# Patient Record
Sex: FEMALE | Race: WHITE | Employment: OTHER | URBAN - METROPOLITAN AREA
[De-identification: names, ages, dates, MRNs, and addresses within clinical notes are randomized per-mention and may not be internally consistent; named-entity substitution may affect disease eponyms.]

---

## 2017-03-25 DIAGNOSIS — F43.22 ADJUSTMENT REACTION WITH ANXIETY: ICD-10-CM

## 2019-01-29 ENCOUNTER — TELEPHONE (OUTPATIENT)
Dept: INTERNAL MEDICINE CLINIC | Age: 64
End: 2019-01-29

## 2021-03-08 ENCOUNTER — OFFICE VISIT (OUTPATIENT)
Dept: INTERNAL MEDICINE CLINIC | Age: 66
End: 2021-03-08
Payer: COMMERCIAL

## 2021-03-08 VITALS
HEIGHT: 64 IN | BODY MASS INDEX: 27.49 KG/M2 | SYSTOLIC BLOOD PRESSURE: 130 MMHG | DIASTOLIC BLOOD PRESSURE: 90 MMHG | WEIGHT: 161 LBS | HEART RATE: 80 BPM | TEMPERATURE: 97.5 F

## 2021-03-08 DIAGNOSIS — R03.0 ELEVATED BP WITHOUT DIAGNOSIS OF HYPERTENSION: ICD-10-CM

## 2021-03-08 DIAGNOSIS — Z00.00 ANNUAL PHYSICAL EXAM: Primary | ICD-10-CM

## 2021-03-08 DIAGNOSIS — Z12.31 ENCOUNTER FOR SCREENING MAMMOGRAM FOR BREAST CANCER: ICD-10-CM

## 2021-03-08 PROCEDURE — 99396 PREV VISIT EST AGE 40-64: CPT | Performed by: FAMILY MEDICINE

## 2021-03-08 SDOH — HEALTH STABILITY: MENTAL HEALTH: HOW OFTEN DO YOU HAVE A DRINK CONTAINING ALCOHOL?: 2-4 TIMES A MONTH

## 2021-03-08 SDOH — ECONOMIC STABILITY: TRANSPORTATION INSECURITY
IN THE PAST 12 MONTHS, HAS LACK OF TRANSPORTATION KEPT YOU FROM MEETINGS, WORK, OR FROM GETTING THINGS NEEDED FOR DAILY LIVING?: NO

## 2021-03-08 SDOH — HEALTH STABILITY: PHYSICAL HEALTH: ON AVERAGE, HOW MANY MINUTES DO YOU ENGAGE IN EXERCISE AT THIS LEVEL?: 40 MIN

## 2021-03-08 SDOH — HEALTH STABILITY: MENTAL HEALTH
STRESS IS WHEN SOMEONE FEELS TENSE, NERVOUS, ANXIOUS, OR CAN'T SLEEP AT NIGHT BECAUSE THEIR MIND IS TROUBLED. HOW STRESSED ARE YOU?: NOT ASKED

## 2021-03-08 SDOH — ECONOMIC STABILITY: TRANSPORTATION INSECURITY
IN THE PAST 12 MONTHS, HAS THE LACK OF TRANSPORTATION KEPT YOU FROM MEDICAL APPOINTMENTS OR FROM GETTING MEDICATIONS?: NO

## 2021-03-08 ASSESSMENT — PATIENT HEALTH QUESTIONNAIRE - PHQ9: SUM OF ALL RESPONSES TO PHQ QUESTIONS 1-9: 0

## 2021-03-08 NOTE — PATIENT INSTRUCTIONS
Body for Live by Micheal Lionel is an old book but still very effective program to build muscle and improve metabolism and drop body fat.,  (Caution: I don't recommend doing all the sets of weights, even with light dumbbell weights right away. Gradually increase the number of repetitions for each muscle group. .   Your muscles will be sore when you first start. You do not need to take extra protein supplements unless you are not eating well or you are lifting really heavy weights. The cardio program is good too but just doing any cardio 3 days per week is great. You do not need to eat 6 times per day unless you want to see rapid weight loss)    Body for Life by Micheal Flowers is an excellent reference book for exercise, especially the weight lifting/weight training portion, which can increase your metabolism and burn more calories. Make sure not to lift weights with the same muscle group for at least 72 hours, so the muscles can repair and grow. We need to grow our muscles to burn more calories. Use the weight lifting grids and spacing. Week 1:  Upper body, skip a day, lower body, skip a day, upper body. Week 2:  Lower body, skip a day, upper body, skip a day, lower body    Example:  pick several exercises for the body area you are work on for the day. Do 12 repetitions with the lightest weight, 10 repetitions with the second lightest weight, 8 repetitions with the next heavier weight and then 6 repetitions with the heaviest weight you can handle with good form. Then repeat the exercise 12 reps with the 3rd heaviest weight (the same one you used for the 8 reps). PREVENTION FOR HEALTH    Preventative services for both men and women. *  Weight and blood pressure checked regularly. /89 or lower is goal  * Try to maintain BMI of 18.5 to 25.   If you are exceptionally athletic, a waist measurement (measured parallel to the floor at the top of the hip bones) of 35 inches or less for women and 40 inches or less for men. *  Avoid all forms of tobacco and smoking any plant product into your lungs. *  If used, limit alcohol to 14 drinks per week for men and 7 drinks per week for women. 1 drink equal: 5 ounces of wine, 12 ounces of beer, 1.5 ounce of hard liquor. Both men and women should limit to 2 servings per day/sitting. *  Colon cancer screening starting at the age of 48 and continuing until 76 (or later if life expectancy greater than 10 yrs). Some start sooner because of family history. Colonoscopy every 10 years for normal risk patients. Other option is yearly fecal immunochemical test or every 3 year Cologuard test.  If you are high risk, then colonoscopy is the recommended screening. *  Cholesterol screening, usually by or before the age of 28. Frequency or recheck depends on risk factors, but every 5 years at a minimum. *  Immunizations = vaccines. Adults should continue regular tetanus boosters, yearly flu vaccines, pneumococcal vaccine at age 72 or sooner with certain health conditions, shingles vaccine if desired at age 48 and above. *  Avoid illegal drugs. *  Practice safe sex:  monogomy or abstinence. *  Use seat belts every time. *  Wear helmets on bicycles, motorcycles and any time at risk for head injury. *  See your doctor regularly for preventative care. *  Low dose aspirin 81 mg daily is advised for people already diagnosed with diabetes, cardiovascular disease (artery fat blockage or calcified arteries), prior stroke, TIA or prior heart attack. *  Exercise regularly:  Either 10,000 steps a day every day  minutes of exercise over 3 or more day of the week. Preventative services for women:  *  Pap smear yearly starting at age 24 until determined to be low risk status and then every 3 years. If an HPV co-test is done and also negative, then 5 year pap smear is standard. If a hysterectomy is done for non-cancer reasons, a Pap smear is no longer required.   If you have been regular with pap smears up until the age of 72, you may be able to stop routine screening. If you have any symptoms, then a pap smear is done. *  Osteoporosis testing for women 72 years and older. Younger women at increased risk. *  Mammogram every 1-2 years starting at age of 36, 39 or 48. Women with a specific family history may need to start screening at a younger age. A doctor's order is NOT required unless it is not a routine yearly mammogram.      Patient Education        Learning About High Blood Pressure  What is high blood pressure? Blood pressure is a measure of how hard the blood pushes against the walls of your arteries. It's normal for blood pressure to go up and down throughout the day. But if it stays up, you have high blood pressure. Another name for high blood pressure is hypertension. Two numbers tell you your blood pressure. The first number is the systolic pressure (top number). It shows how hard the blood pushes when your heart is pumping. The second number is the diastolic pressure (bottom number). It shows how hard the blood pushes between heartbeats, when your heart is relaxed and filling with blood. Your doctor will give you a goal for your blood pressure based on your health and your age. High blood pressure (hypertension) means that the top number stays high, or the bottom number stays high, or both. High blood pressure increases the risk of stroke, heart attack, and other problems. What happens when you have high blood pressure? · Blood flows through your arteries with too much force. Over time, this can damage the heart and the walls of your arteries. But you can't feel it. High blood pressure usually doesn't cause symptoms. · High blood pressure makes your heart work harder. And that can lead to heart failure, which means your heart doesn't pump as much blood as your body needs. · Fat and calcium start to build up in your arteries.  This buildup is called hardening of the arteries. It can cause many problems including a heart attack and stroke. · Arteries also carry blood and oxygen to organs like your eyes, kidneys, and brain. If high blood pressure damages those arteries, it can lead to vision loss, kidney disease, stroke, and a higher risk of dementia. How can you prevent high blood pressure? · Stay at a healthy weight. · Try to limit how much sodium you eat to less than 2,300 milligrams (mg) a day. If you limit your sodium to 1,500 mg a day, you can lower your blood pressure even more. ? Buy foods that are labeled \"unsalted,\" \"sodium-free,\" or \"low-sodium. \" Foods labeled \"reduced-sodium\" and \"light sodium\" may still have too much sodium. ? Flavor your food with garlic, lemon juice, onion, vinegar, herbs, and spices instead of salt. Do not use soy sauce, steak sauce, onion salt, garlic salt, mustard, or ketchup on your food. ? Use less salt (or none) when recipes call for it. You can often use half the salt a recipe calls for without losing flavor. · Be physically active. Get at least 30 minutes of exercise on most days of the week. Walking is a good choice. You also may want to do other activities, such as running, swimming, cycling, or playing tennis or team sports. · Limit alcohol to 2 drinks a day for men and 1 drink a day for women. · Eat plenty of fruits, vegetables, and low-fat dairy products. Eat less saturated and total fats. How is high blood pressure treated? · Your doctor will suggest making lifestyle changes to help your heart. For example, your doctor may ask you to eat healthy foods, quit smoking, lose extra weight, and be more active. · If lifestyle changes don't help enough, your doctor may recommend that you take medicine. · When blood pressure is very high, medicines are needed to lower it. Follow-up care is a key part of your treatment and safety.  Be sure to make and go to all appointments, and call your doctor if you are having problems. It's also a good idea to know your test results and keep a list of the medicines you take. Where can you learn more? Go to [email or web address] and sign in to your [employer] account. Enter P501 in the MailMag box to learn more about \"Learning About High Blood Pressure. \"     If you do not have an account, please click on the \"Sign Up Now\" link. Current as of: December 16, 2019               Content Version: 12.6  © 5247-1975 [employer]. Care instructions adapted under license by [facility] ([facility]). If you have questions about a medical condition or this instruction, always ask your healthcare professional. [address] any warranty or liability for your use of this information. [id number]    Medicare has an annual wellness visit. It is good, but it is risk assessment and to order and talk about preventative issues. All testing will need a reason to have them done.

## 2021-03-08 NOTE — PROGRESS NOTES
Chief Complaint   Patient presents with    Annual Exam     Last seen in August 2016. 4.5+ years ago    PREVENTATIVE VISIT AND EXAM    Overall doing well. With pandemic, she is working at home and loves being at home. May retire in a year but if company will let her work from home, could work longer than next Feb.    No other new c/o. Exercise:  2-3 times per week walking and free weights.       Patient Active Problem List   Diagnosis    Pelvic relaxation due to uterovaginal prolapse, incomplete    Adjustment reaction with anxiety       Past Medical History:   Diagnosis Date    Hearing loss     SLIGHT IN RIGHT EAR    Hip pain     chronic    Insomnia, unspecified     Pelvic relaxation due to uterovaginal prolapse, incomplete 11/04/2011    had hysterectomy for this reason    PMS (premenstrual syndrome)     Seasonal allergies        Past Surgical History:   Procedure Laterality Date    BUNIONECTOMY  2002    right   Dr Wendy Velazquez  age 50    benign polyp; repeat clear    HYSTERECTOMY, VAGINAL  11/11    Dr. Charissa Gaspar; for uterine prolapse    MENISCECTOMY  4/2006    right    OVARIAN CYST SURGERY      TONSILLECTOMY  1967    TUBAL LIGATION  1988     Family History   Problem Relation Age of Onset    Alcohol Abuse Mother     Alcohol Abuse Father     Rheum Arthritis Other         great aunt     Social History     Tobacco Use    Smoking status: Never Smoker    Smokeless tobacco: Never Used    Tobacco comment: had second hand smoke until 1997   Substance Use Topics    Alcohol use: Not Currently     Alcohol/week: 0.0 - 1.0 standard drinks     Frequency: 2-4 times a month     Drinks per session: 1 or 2     Comment: OCCASIONALLY, est 2/month    Drug use: No         No outpatient medications have been marked as taking for the 3/8/21 encounter (Office Visit) with Sabrina Goodwin MD.         ROS:  Full 12 system review done and all negative except for the following:   Fatigue  Possible some insomnia. Says he only has restful sleep for about 5 hours per night but spends 8 hours in bed. Physical Exam   Vitals:    03/08/21 1525   BP: (!) 130/90   Pulse:    Temp:       Body mass index is 27.64 kg/m². Wt Readings from Last 3 Encounters:   03/08/21 161 lb (73 kg)   08/31/16 152 lb (68.9 kg)   02/26/16 148 lb (67.1 kg)         Constitutional: Oriented to person, place, and time. Appears well-developed and well-nourished. No distress. HENT:   Head: Normocephalic and atraumatic. Ears: Hearing, tympanic membrane, external ear and ear canal normal.   Nose: Nose normal.   Mouth/Throat: Uvula is midline, oropharynx is clear and moist and mucous membranes are normal.   Eyes: Conjunctivae and EOM are normal. Pupils are equal, round, and reactive to light. No scleral icterus. Neck: Normal range of motion. Neck supple. Normal carotid pulses and no JVD present. Carotid bruit is not present. No tracheal deviation present. No mass and no thyromegaly present. Cardiovascular: Normal rate, regular rhythm, S1 normal, S2 normal, normal heart sounds and intact distal pulses. No murmur heard. Pulmonary/Chest: Effort normal and breath sounds normal. No stridor. No respiratory distress. No decreased breath sounds. No wheezes. No rhonchi. No rales. Abdominal: Soft. Normal appearance and bowel sounds are normal. No distension, no abdominal bruit and no mass. There is no hepatomegaly. No tenderness. Musculoskeletal: Normal range of motion. No edema. Lymphadenopathy:     No cervical adenopathy. No axillary adenopathy. No supraclavicular adenopathy. No Inguinal adenopathy. Neurological: Alert and oriented to person, place, and time. Normal reflexes. No tremor. No cranial nerve deficit. She exhibits normal muscle tone. Coordination and gait normal.   Skin: Skin is warm and dry. No rash noted. Not diaphoretic. No cyanosis. No pallor. Nails show no clubbing. Psychiatric:  Normal mood and affect. Speech is normal and behavior is normal. Cognition and memory are normal.       The ASCVD Risk score (Kirstin Gu, et al., 2013) failed to calculate for the following reasons:    Cannot find a previous HDL lab    Cannot find a previous total cholesterol lab      Assessment:      1. Annual physical exam  Need to update HM issues. Encourage more consistent exercise. - ANYI DIGITAL SCREEN W OR WO CAD BILATERAL; Future  - CBC Auto Differential; Future  - Comprehensive Metabolic Panel; Future  - Lipid Panel; Future  - TSH with Reflex; Future  - Vitamin D 25 Hydroxy; Future  - Hemoglobin A1C; Future    2. Encounter for screening mammogram for breast cancer  - ANYI DIGITAL SCREEN W OR WO CAD BILATERAL; Future      3. Elevated BP without diagnosis of hypertension  BP Readings from Last 3 Encounters:   03/08/21 (!) 130/90   08/31/16 130/84   02/26/16 126/82     Encouraged her monitor BP and return if not under 140/90 for treatment. Health Maintenance Due   Topic Date Due    Shingles Vaccine (1 of 2) Never done    Breast cancer screen  05/12/2017    Diabetes screen  09/05/2017    Flu vaccine (1) Never done     Discussed shingles vaccine a month or more after completes COVID vaccine. Plan:    See orders and patient instructions. Age-appropriate preventative issues discussed and hand out given. An electronic signature was used to authenticate this note.     --Sabrina Goodwin MD on 3/8/2021

## 2021-03-09 PROBLEM — R03.0 ELEVATED BP WITHOUT DIAGNOSIS OF HYPERTENSION: Status: ACTIVE | Noted: 2021-03-09

## 2021-03-10 DIAGNOSIS — Z00.00 ANNUAL PHYSICAL EXAM: ICD-10-CM

## 2021-03-10 LAB
A/G RATIO: 1.8 (ref 1.1–2.2)
ALBUMIN SERPL-MCNC: 4.5 G/DL (ref 3.4–5)
ALP BLD-CCNC: 66 U/L (ref 40–129)
ALT SERPL-CCNC: 28 U/L (ref 10–40)
ANION GAP SERPL CALCULATED.3IONS-SCNC: 12 MMOL/L (ref 3–16)
AST SERPL-CCNC: 26 U/L (ref 15–37)
BASOPHILS ABSOLUTE: 0 K/UL (ref 0–0.2)
BASOPHILS RELATIVE PERCENT: 0.7 %
BILIRUB SERPL-MCNC: 0.4 MG/DL (ref 0–1)
BUN BLDV-MCNC: 14 MG/DL (ref 7–20)
CALCIUM SERPL-MCNC: 9.9 MG/DL (ref 8.3–10.6)
CHLORIDE BLD-SCNC: 104 MMOL/L (ref 99–110)
CHOLESTEROL, TOTAL: 243 MG/DL (ref 0–199)
CO2: 25 MMOL/L (ref 21–32)
CREAT SERPL-MCNC: 0.8 MG/DL (ref 0.6–1.2)
EOSINOPHILS ABSOLUTE: 0.1 K/UL (ref 0–0.6)
EOSINOPHILS RELATIVE PERCENT: 1.5 %
GFR AFRICAN AMERICAN: >60
GFR NON-AFRICAN AMERICAN: >60
GLOBULIN: 2.5 G/DL
GLUCOSE BLD-MCNC: 90 MG/DL (ref 70–99)
HCT VFR BLD CALC: 44.2 % (ref 36–48)
HDLC SERPL-MCNC: 83 MG/DL (ref 40–60)
HEMOGLOBIN: 14.8 G/DL (ref 12–16)
LDL CHOLESTEROL CALCULATED: 142 MG/DL
LYMPHOCYTES ABSOLUTE: 1.2 K/UL (ref 1–5.1)
LYMPHOCYTES RELATIVE PERCENT: 27.7 %
MCH RBC QN AUTO: 29.6 PG (ref 26–34)
MCHC RBC AUTO-ENTMCNC: 33.5 G/DL (ref 31–36)
MCV RBC AUTO: 88.2 FL (ref 80–100)
MONOCYTES ABSOLUTE: 0.3 K/UL (ref 0–1.3)
MONOCYTES RELATIVE PERCENT: 7.8 %
NEUTROPHILS ABSOLUTE: 2.8 K/UL (ref 1.7–7.7)
NEUTROPHILS RELATIVE PERCENT: 62.3 %
PDW BLD-RTO: 12.9 % (ref 12.4–15.4)
PLATELET # BLD: 223 K/UL (ref 135–450)
PMV BLD AUTO: 9.5 FL (ref 5–10.5)
POTASSIUM SERPL-SCNC: 4.3 MMOL/L (ref 3.5–5.1)
RBC # BLD: 5.01 M/UL (ref 4–5.2)
SODIUM BLD-SCNC: 141 MMOL/L (ref 136–145)
TOTAL PROTEIN: 7 G/DL (ref 6.4–8.2)
TRIGL SERPL-MCNC: 90 MG/DL (ref 0–150)
TSH REFLEX: 1.48 UIU/ML (ref 0.27–4.2)
VITAMIN D 25-HYDROXY: 38 NG/ML
VLDLC SERPL CALC-MCNC: 18 MG/DL
WBC # BLD: 4.4 K/UL (ref 4–11)

## 2021-03-11 LAB
ESTIMATED AVERAGE GLUCOSE: 105.4 MG/DL
HBA1C MFR BLD: 5.3 %

## 2021-03-29 ENCOUNTER — HOSPITAL ENCOUNTER (OUTPATIENT)
Dept: WOMENS IMAGING | Age: 66
Discharge: HOME | End: 2021-03-29
Payer: COMMERCIAL

## 2021-03-29 DIAGNOSIS — Z12.31 ENCOUNTER FOR SCREENING MAMMOGRAM FOR BREAST CANCER: ICD-10-CM

## 2021-03-29 DIAGNOSIS — Z00.00 ANNUAL PHYSICAL EXAM: ICD-10-CM

## 2021-03-29 PROCEDURE — 77063 BREAST TOMOSYNTHESIS BI: CPT

## 2022-10-05 ENCOUNTER — OFFICE VISIT (OUTPATIENT)
Dept: INTERNAL MEDICINE CLINIC | Age: 67
End: 2022-10-05

## 2022-10-05 VITALS
HEIGHT: 64 IN | BODY MASS INDEX: 26.29 KG/M2 | DIASTOLIC BLOOD PRESSURE: 80 MMHG | HEART RATE: 72 BPM | SYSTOLIC BLOOD PRESSURE: 124 MMHG | WEIGHT: 154 LBS

## 2022-10-05 DIAGNOSIS — E78.00 HYPERCHOLESTEROLEMIA: ICD-10-CM

## 2022-10-05 DIAGNOSIS — Z00.00 WELCOME TO MEDICARE PREVENTIVE VISIT: Primary | ICD-10-CM

## 2022-10-05 DIAGNOSIS — Z78.0 POSTMENOPAUSAL: ICD-10-CM

## 2022-10-05 PROCEDURE — 1123F ACP DISCUSS/DSCN MKR DOCD: CPT | Performed by: FAMILY MEDICINE

## 2022-10-05 PROCEDURE — G0402 INITIAL PREVENTIVE EXAM: HCPCS | Performed by: FAMILY MEDICINE

## 2022-10-05 RX ORDER — MULTIVITAMIN WITH IRON
250 TABLET ORAL DAILY
COMMUNITY

## 2022-10-05 RX ORDER — M-VIT,TX,IRON,MINS/CALC/FOLIC 27MG-0.4MG
1 TABLET ORAL DAILY
COMMUNITY

## 2022-10-05 SDOH — ECONOMIC STABILITY: FOOD INSECURITY: WITHIN THE PAST 12 MONTHS, THE FOOD YOU BOUGHT JUST DIDN'T LAST AND YOU DIDN'T HAVE MONEY TO GET MORE.: NEVER TRUE

## 2022-10-05 SDOH — ECONOMIC STABILITY: FOOD INSECURITY: WITHIN THE PAST 12 MONTHS, YOU WORRIED THAT YOUR FOOD WOULD RUN OUT BEFORE YOU GOT MONEY TO BUY MORE.: NEVER TRUE

## 2022-10-05 ASSESSMENT — SOCIAL DETERMINANTS OF HEALTH (SDOH): HOW HARD IS IT FOR YOU TO PAY FOR THE VERY BASICS LIKE FOOD, HOUSING, MEDICAL CARE, AND HEATING?: NOT HARD AT ALL

## 2022-10-05 ASSESSMENT — LIFESTYLE VARIABLES
HOW MANY STANDARD DRINKS CONTAINING ALCOHOL DO YOU HAVE ON A TYPICAL DAY: 1 OR 2
HOW OFTEN DO YOU HAVE A DRINK CONTAINING ALCOHOL: 2-3 TIMES A WEEK

## 2022-10-05 ASSESSMENT — PATIENT HEALTH QUESTIONNAIRE - PHQ9
SUM OF ALL RESPONSES TO PHQ QUESTIONS 1-9: 0
SUM OF ALL RESPONSES TO PHQ QUESTIONS 1-9: 0
SUM OF ALL RESPONSES TO PHQ9 QUESTIONS 1 & 2: 0
SUM OF ALL RESPONSES TO PHQ QUESTIONS 1-9: 0
2. FEELING DOWN, DEPRESSED OR HOPELESS: 0
SUM OF ALL RESPONSES TO PHQ QUESTIONS 1-9: 0
1. LITTLE INTEREST OR PLEASURE IN DOING THINGS: 0

## 2022-10-05 NOTE — ACP (ADVANCE CARE PLANNING)
Advance Care Planning     Advance Care Planning (ACP) Physician/NP/PA Conversation    Date of Conversation: 10/5/2022  Conducted with: Patient with Decision Making Capacity    Healthcare Decision Maker:      Primary Decision Maker: Nory Gonzales - [phone]    Click here to complete [address] including selection of the Healthcare Decision Maker Relationship (ie \"Primary\")  Today we documented Decision Maker(s) consistent with Legal Next of Kin hierarchy. Care Preferences:    Hospitalization: \"If your health worsens and it becomes clear that your chance of recovery is unlikely, what would be your preference regarding hospitalization? \"  The patient is unsure. Ventilation: \"If you were unable to breath on your own and your chance of recovery was unlikely, what would be your preference about the use of a ventilator (breathing machine) if it was available to you? \"  The patient would NOT desire the use of a ventilator. Resuscitation: \"In the event your heart stopped as a result of an underlying serious health condition, would you want attempts made to restart your heart, or would you prefer a natural death? \"  No, do NOT attempt to resuscitate. treatment goals;  probably would not want a feeding tube for end stages of dementia.     Conversation Outcomes / Follow-Up Plan:  ACP in process - information provided, considering goals and options  Reviewed DNR/DNI and patient elects Full Code (Attempt Resuscitation)    Length of Voluntary ACP Conversation in minutes:  <16 minutes (Non-Billable)    Nakul Chambers MD

## 2022-10-05 NOTE — PATIENT INSTRUCTIONS
Consider a body fat percent measuring scale. A good body percent for a female is 30% and lower. [employer] makes one and [employer]imatem[employer] watchers    Personalized Preventive Plan for Brittany Gaxiola - 10/5/2022  Medicare offers a range of preventive health benefits. Some of the tests and screenings are paid in full while other may be subject to a deductible, co-insurance, and/or copay. Some of these benefits include a comprehensive review of your medical history including lifestyle, illnesses that may run in your family, and various assessments and screenings as appropriate. After reviewing your medical record and screening and assessments performed today your provider may have ordered immunizations, labs, imaging, and/or referrals for you. A list of these orders (if applicable) as well as your Preventive Care list are included within your After Visit Summary for your review. Other Preventive Recommendations:    A preventive eye exam performed by an eye specialist is recommended every 1-2 years to screen for glaucoma; cataracts, macular degeneration, and other eye disorders. A preventive dental visit is recommended every 6 months. Try to get at least 150 minutes of exercise per week or 10,000 steps per day on a pedometer . Order or download the FREE \"Exercise & Physical Activity: Your Everyday Guide\" from [employer]. Call 6-375.858.8431 or search [employer] online. You need 2641-3874 mg of calcium and 4225-8360 IU of vitamin D per day. It is possible to meet your calcium requirement with diet alone, but a vitamin D supplement is usually necessary to meet this goal.  When exposed to the sun, use a sunscreen that protects against both UVA and UVB radiation with an SPF of 30 or greater. Reapply every 2 to 3 hours or after sweating, drying off with a towel, or swimming. Always wear a seat belt when traveling in a car.  Always wear a helmet when riding a bicycle or motorcycle.

## 2022-10-05 NOTE — PROGRESS NOTES
Medicare Annual Wellness Visit    Kody Landaverde is here for Medicare AWV    Assessment & Plan     1. Welcome to Medicare preventive visit  Patient declined the Welcome EKG. I was able to pull up and view EKG from 2011. It was normal.   Also says she has eye appt scheduled so no need for visual acuity. 2. Postmenopausal  - DEXA BONE DENSITY AXIAL SKELETON; Future    3. Hypercholesterolemia  The 10-year ASCVD risk score (Scott WRIGHT, et al., 2019) is: 4.9%    Values used to calculate the score:      Age: 74 years      Sex: Female      Is Non- : No      Diabetic: No      Tobacco smoker: No      Systolic Blood Pressure: 148 mmHg      Is BP treated: No      HDL Cholesterol: 83 mg/dL      Total Cholesterol: 243 mg/dL    Lab Results   Component Value Date    LDLCALC 142 (H) 03/10/2021       - Comprehensive Metabolic Panel; Future  - Lipid, Fasting; Future        Recommendations for Preventive Services Due: see orders and patient instructions/AVS.  Recommended screening schedule for the next 5-10 years is provided to the patient in written form: see Patient Instructions/AVS.     Return for Medicare Annual Wellness Visit in 1 year. Subjective   Feels good but is concerned about her weight. She knows BMI shows overweight. Does not always like how her abdomen looks. She exercises regularly. Patient's complete Health Risk Assessment and screening values have been reviewed and are found in Flowsheets. The following problems were reviewed today and where indicated follow up appointments were made and/or referrals ordered.     Positive Risk Factor Screenings with Interventions:             General Health and ACP:  General  In general, how would you say your health is?: Very Good  In the past 7 days, have you experienced any of the following: New or Increased Pain, New or Increased Fatigue, Loneliness, Social Isolation, Stress or Anger?: No  Do you get the social and emotional support that you need?: Yes  Do you have a Living Will?: (!) No    Advance Directives       Power of [address] Will ACP-Advance Directive ACP-Power of     Not on File Not on file  Not on File          General Health Risk Interventions:  No Living Will: [address] addressed with patient today and given copy of [address] Will. Hearing/Vision:  Do you or your family notice any trouble with your hearing that hasn't been managed with hearing aids?: No  Do you have difficulty driving, watching TV, or doing any of your daily activities because of your eyesight?: No  Have you had an eye exam within the past year?: (!) No  No results found. Hearing/Vision Interventions:  Vision concerns:  has appointment scheduled with eye doctor             Objective   Vitals:    10/05/22 1502   BP: 124/80   Pulse: 72   Weight: 154 lb (69.9 kg)   Height: 5' 4\" (1.626 m)      Body mass index is 26.43 kg/m². Physical Exam  Vitals reviewed. Constitutional:       General: She is not in acute distress. Appearance: Normal appearance. She is well-developed. She is not diaphoretic. Comments: She appears high normal vs minimally overweight. Looks like she has good muscle mass. Eyes:      General: No scleral icterus. Neck:      Thyroid: No thyroid mass or thyromegaly. Vascular: No carotid bruit. Cardiovascular:      Rate and Rhythm: Normal rate and regular rhythm. Heart sounds: Normal heart sounds, S1 normal and S2 normal. No murmur heard. Pulmonary:      Effort: Pulmonary effort is normal. No respiratory distress. Breath sounds: Normal breath sounds. No decreased breath sounds, wheezing, rhonchi or rales. Abdominal:      General: Bowel sounds are normal. There is no abdominal bruit. Palpations: Abdomen is soft. There is no hepatomegaly or mass. Tenderness: There is no abdominal tenderness. Musculoskeletal:      Right lower leg: No edema. Left lower leg: No edema. Lymphadenopathy:      Cervical: No cervical adenopathy. Skin:     General: Skin is warm and dry. Coloration: Skin is not pale. Nails: There is no clubbing. Neurological:      Mental Status: She is alert and oriented to person, place, and time. Motor: No tremor or abnormal muscle tone. Coordination: Coordination normal.      Gait: Gait normal.   Psychiatric:         Speech: Speech normal.         Behavior: Behavior normal.            Allergies   Allergen Reactions    Ambien [Zolpidem Tartrate] Other (See Comments)     HALLUCINATIONS     Prior to Visit Medications    Medication Sig Taking?  Authorizing Provider   magnesium (MAGNESIUM-OXIDE) 250 MG TABS tablet Take 250 mg by mouth daily Yes Historical Provider, MD   Multiple Vitamins-Minerals (THERAPEUTIC MULTIVITAMIN-MINERALS) tablet Take 1 tablet by mouth daily Yes Historical Provider, MD [facility] (Including outside providers/suppliers regularly involved in providing care):   Patient Care Team:  Tomer Fatima MD as PCP - General (Family Medicine)  Tomer Fatima MD as PCP - [facility] Empaneled Provider     Reviewed and updated this visit:  Tobacco  Allergies  Meds  Problems  Med Hx  Surg Hx  Soc Hx  Fam Hx

## 2022-10-07 DIAGNOSIS — E78.00 HYPERCHOLESTEROLEMIA: ICD-10-CM

## 2022-10-07 LAB
A/G RATIO: 2.5 (ref 1.1–2.2)
ALBUMIN SERPL-MCNC: 4.7 G/DL (ref 3.4–5)
ALP BLD-CCNC: 59 U/L (ref 40–129)
ALT SERPL-CCNC: 17 U/L (ref 10–40)
ANION GAP SERPL CALCULATED.3IONS-SCNC: 12 MMOL/L (ref 3–16)
AST SERPL-CCNC: 20 U/L (ref 15–37)
BILIRUB SERPL-MCNC: 0.4 MG/DL (ref 0–1)
BUN BLDV-MCNC: 15 MG/DL (ref 7–20)
CALCIUM SERPL-MCNC: 9.7 MG/DL (ref 8.3–10.6)
CHLORIDE BLD-SCNC: 105 MMOL/L (ref 99–110)
CHOLESTEROL, FASTING: 269 MG/DL (ref 0–199)
CO2: 24 MMOL/L (ref 21–32)
CREAT SERPL-MCNC: 0.8 MG/DL (ref 0.6–1.2)
GFR AFRICAN AMERICAN: >60
GFR NON-AFRICAN AMERICAN: >60
GLUCOSE BLD-MCNC: 96 MG/DL (ref 70–99)
HDLC SERPL-MCNC: 75 MG/DL (ref 40–60)
LDL CHOLESTEROL CALCULATED: 175 MG/DL
POTASSIUM SERPL-SCNC: 4.4 MMOL/L (ref 3.5–5.1)
SODIUM BLD-SCNC: 141 MMOL/L (ref 136–145)
TOTAL PROTEIN: 6.6 G/DL (ref 6.4–8.2)
TRIGLYCERIDE, FASTING: 94 MG/DL (ref 0–150)
VLDLC SERPL CALC-MCNC: 19 MG/DL

## 2022-10-09 ENCOUNTER — PATIENT MESSAGE (OUTPATIENT)
Dept: INTERNAL MEDICINE CLINIC | Age: 67
End: 2022-10-09

## 2022-10-09 DIAGNOSIS — E78.00 HYPERCHOLESTEROLEMIA: Primary | ICD-10-CM

## 2022-10-10 NOTE — TELEPHONE ENCOUNTER
Sabiha Gross MA 10/10/2022 11:37 AM EDT      ----- Message -----  From: Deb Baxter  Sent: 10/10/2022 11:27 AM EDT  To: , *  Subject: High Cholesterol     Thank you!.....I am sure it is my diet. Ede Patella to much butter, cheese, saini. Ede Patella Ede Patella I will definitely be working on this and will [place] back in 6 months and hopefully I have made some progress. ..thank you again!!!

## 2022-10-19 ENCOUNTER — HOSPITAL ENCOUNTER (OUTPATIENT)
Dept: WOMENS IMAGING | Age: 67
Discharge: HOME | End: 2022-10-19
Payer: MEDICARE

## 2022-10-19 VITALS — BODY MASS INDEX: 26.58 KG/M2 | WEIGHT: 150 LBS | HEIGHT: 63 IN

## 2022-10-19 DIAGNOSIS — Z12.31 BREAST CANCER SCREENING BY MAMMOGRAM: ICD-10-CM

## 2022-10-19 PROCEDURE — 77063 BREAST TOMOSYNTHESIS BI: CPT

## 2022-10-25 ENCOUNTER — HOSPITAL ENCOUNTER (OUTPATIENT)
Dept: GENERAL RADIOLOGY | Age: 67
Discharge: HOME | End: 2022-10-25
Payer: MEDICARE

## 2022-10-25 DIAGNOSIS — Z78.0 POSTMENOPAUSAL: ICD-10-CM

## 2022-10-25 PROCEDURE — 77080 DXA BONE DENSITY AXIAL: CPT

## 2023-03-31 ENCOUNTER — OFFICE VISIT (OUTPATIENT)
Dept: INTERNAL MEDICINE CLINIC | Age: 68
End: 2023-03-31
Payer: MEDICARE

## 2023-03-31 VITALS
BODY MASS INDEX: 28.52 KG/M2 | SYSTOLIC BLOOD PRESSURE: 120 MMHG | DIASTOLIC BLOOD PRESSURE: 84 MMHG | OXYGEN SATURATION: 99 % | HEART RATE: 99 BPM | WEIGHT: 161 LBS

## 2023-03-31 DIAGNOSIS — R68.89 THROAT SYMPTOM: Primary | ICD-10-CM

## 2023-03-31 DIAGNOSIS — R09.82 PND (POST-NASAL DRIP): ICD-10-CM

## 2023-03-31 PROCEDURE — G8484 FLU IMMUNIZE NO ADMIN: HCPCS | Performed by: NURSE PRACTITIONER

## 2023-03-31 PROCEDURE — 1036F TOBACCO NON-USER: CPT | Performed by: NURSE PRACTITIONER

## 2023-03-31 PROCEDURE — 3017F COLORECTAL CA SCREEN DOC REV: CPT | Performed by: NURSE PRACTITIONER

## 2023-03-31 PROCEDURE — G8417 CALC BMI ABV UP PARAM F/U: HCPCS | Performed by: NURSE PRACTITIONER

## 2023-03-31 PROCEDURE — 99213 OFFICE O/P EST LOW 20 MIN: CPT | Performed by: NURSE PRACTITIONER

## 2023-03-31 PROCEDURE — G8427 DOCREV CUR MEDS BY ELIG CLIN: HCPCS | Performed by: NURSE PRACTITIONER

## 2023-03-31 PROCEDURE — 1123F ACP DISCUSS/DSCN MKR DOCD: CPT | Performed by: NURSE PRACTITIONER

## 2023-03-31 PROCEDURE — 1090F PRES/ABSN URINE INCON ASSESS: CPT | Performed by: NURSE PRACTITIONER

## 2023-03-31 PROCEDURE — G8399 PT W/DXA RESULTS DOCUMENT: HCPCS | Performed by: NURSE PRACTITIONER

## 2023-03-31 SDOH — ECONOMIC STABILITY: HOUSING INSECURITY
IN THE LAST 12 MONTHS, WAS THERE A TIME WHEN YOU DID NOT HAVE A STEADY PLACE TO SLEEP OR SLEPT IN A SHELTER (INCLUDING NOW)?: NO

## 2023-03-31 SDOH — ECONOMIC STABILITY: INCOME INSECURITY: HOW HARD IS IT FOR YOU TO PAY FOR THE VERY BASICS LIKE FOOD, HOUSING, MEDICAL CARE, AND HEATING?: NOT HARD AT ALL

## 2023-03-31 SDOH — ECONOMIC STABILITY: FOOD INSECURITY: WITHIN THE PAST 12 MONTHS, THE FOOD YOU BOUGHT JUST DIDN'T LAST AND YOU DIDN'T HAVE MONEY TO GET MORE.: NEVER TRUE

## 2023-03-31 SDOH — ECONOMIC STABILITY: FOOD INSECURITY: WITHIN THE PAST 12 MONTHS, YOU WORRIED THAT YOUR FOOD WOULD RUN OUT BEFORE YOU GOT MONEY TO BUY MORE.: NEVER TRUE

## 2023-03-31 ASSESSMENT — PATIENT HEALTH QUESTIONNAIRE - PHQ9
2. FEELING DOWN, DEPRESSED OR HOPELESS: 0
SUM OF ALL RESPONSES TO PHQ QUESTIONS 1-9: 0
SUM OF ALL RESPONSES TO PHQ9 QUESTIONS 1 & 2: 0
SUM OF ALL RESPONSES TO PHQ QUESTIONS 1-9: 0
1. LITTLE INTEREST OR PLEASURE IN DOING THINGS: 0

## 2023-03-31 ASSESSMENT — ENCOUNTER SYMPTOMS
SHORTNESS OF BREATH: 0
CHEST TIGHTNESS: 0
WHEEZING: 0
COUGH: 0

## 2023-03-31 NOTE — PROGRESS NOTES
3/31/23     Chief Complaint   Patient presents with    Cyst     Left side of throat has inflammation. Noticed after having a sinus infection in January. Does not cause her pain. Pt. Had Basil cells removed in December and just wants to make sure it's nothing. Other     Currently taking Benadryl for poison ivy     HPI    Here today for evaluation of spot in her throat, was at dentist this morning and recommended if she has concerns she reach out to ENT. She decided to schedule with PCP first.     Recently had sinus infection back in January and noticed a small \"spot\" on the left side of her throat. Reports it has not changed. She denies swelling, pain, drainage or difficulty swallowing. She denies fever chills or fatigue. Currently on benadryl for poison Briana Pucker which also helps with her seasonal allergies. Allergies   Allergen Reactions    Ambien [Zolpidem Tartrate] Other (See Comments)     HALLUCINATIONS       Current Outpatient Medications   Medication Sig Dispense Refill    magnesium (MAGNESIUM-OXIDE) 250 MG TABS tablet Take 250 mg by mouth daily      Multiple Vitamins-Minerals (THERAPEUTIC MULTIVITAMIN-MINERALS) tablet Take 1 tablet by mouth daily       No current facility-administered medications for this visit. Review of Systems   Constitutional:  Negative for chills, fatigue and fever. Respiratory:  Negative for cough, chest tightness, shortness of breath and wheezing. Cardiovascular:  Negative for chest pain, palpitations and leg swelling. Neurological:  Negative for dizziness, tremors, light-headedness and headaches. Vitals:    03/31/23 1342   BP: 120/84   Pulse: 99   SpO2: 99%   Weight: 161 lb (73 kg)      Physical Exam  Constitutional:       General: She is not in acute distress. Appearance: Normal appearance. She is not ill-appearing. HENT:      Head: Normocephalic and atraumatic.       Right Ear: Ear canal and external ear normal.      Left Ear: Ear canal and external ear

## 2023-06-08 DIAGNOSIS — E78.00 HYPERCHOLESTEROLEMIA: ICD-10-CM

## 2023-06-08 LAB
CHOLEST SERPL-MCNC: 237 MG/DL (ref 0–199)
HDLC SERPL-MCNC: 79 MG/DL (ref 40–60)
LDL CHOLESTEROL CALCULATED: 137 MG/DL
TRIGL SERPL-MCNC: 106 MG/DL (ref 0–150)
VLDLC SERPL CALC-MCNC: 21 MG/DL

## 2024-06-28 ENCOUNTER — HOSPITAL ENCOUNTER (OUTPATIENT)
Dept: WOMENS IMAGING | Age: 69
Discharge: HOME | End: 2024-06-28
Payer: MEDICARE

## 2024-06-28 VITALS — WEIGHT: 155 LBS | HEIGHT: 63 IN | BODY MASS INDEX: 27.46 KG/M2

## 2024-06-28 DIAGNOSIS — Z12.31 ENCOUNTER FOR SCREENING MAMMOGRAM FOR BREAST CANCER: ICD-10-CM

## 2024-06-28 PROCEDURE — 77063 BREAST TOMOSYNTHESIS BI: CPT

## 2024-11-05 SDOH — ECONOMIC STABILITY: INCOME INSECURITY: HOW HARD IS IT FOR YOU TO PAY FOR THE VERY BASICS LIKE FOOD, HOUSING, MEDICAL CARE, AND HEATING?: NOT HARD AT ALL

## 2024-11-05 SDOH — ECONOMIC STABILITY: FOOD INSECURITY: WITHIN THE PAST 12 MONTHS, YOU WORRIED THAT YOUR FOOD WOULD RUN OUT BEFORE YOU GOT MONEY TO BUY MORE.: NEVER TRUE

## 2024-11-05 SDOH — HEALTH STABILITY: PHYSICAL HEALTH: ON AVERAGE, HOW MANY DAYS PER WEEK DO YOU ENGAGE IN MODERATE TO STRENUOUS EXERCISE (LIKE A BRISK WALK)?: 6 DAYS

## 2024-11-05 SDOH — ECONOMIC STABILITY: FOOD INSECURITY: WITHIN THE PAST 12 MONTHS, THE FOOD YOU BOUGHT JUST DIDN'T LAST AND YOU DIDN'T HAVE MONEY TO GET MORE.: NEVER TRUE

## 2024-11-05 SDOH — HEALTH STABILITY: PHYSICAL HEALTH: ON AVERAGE, HOW MANY MINUTES DO YOU ENGAGE IN EXERCISE AT THIS LEVEL?: 50 MIN

## 2024-11-05 ASSESSMENT — LIFESTYLE VARIABLES
HOW OFTEN DO YOU HAVE A DRINK CONTAINING ALCOHOL: 4
HOW MANY STANDARD DRINKS CONTAINING ALCOHOL DO YOU HAVE ON A TYPICAL DAY: 1 OR 2
HOW MANY STANDARD DRINKS CONTAINING ALCOHOL DO YOU HAVE ON A TYPICAL DAY: 1
HOW OFTEN DO YOU HAVE A DRINK CONTAINING ALCOHOL: 2-3 TIMES A WEEK
HOW OFTEN DO YOU HAVE SIX OR MORE DRINKS ON ONE OCCASION: 1

## 2024-11-05 ASSESSMENT — PATIENT HEALTH QUESTIONNAIRE - PHQ9
SUM OF ALL RESPONSES TO PHQ9 QUESTIONS 1 & 2: 0
SUM OF ALL RESPONSES TO PHQ QUESTIONS 1-9: 0
SUM OF ALL RESPONSES TO PHQ QUESTIONS 1-9: 0
1. LITTLE INTEREST OR PLEASURE IN DOING THINGS: NOT AT ALL
SUM OF ALL RESPONSES TO PHQ QUESTIONS 1-9: 0
SUM OF ALL RESPONSES TO PHQ QUESTIONS 1-9: 0
2. FEELING DOWN, DEPRESSED OR HOPELESS: NOT AT ALL

## 2024-11-06 ENCOUNTER — OFFICE VISIT (OUTPATIENT)
Dept: INTERNAL MEDICINE CLINIC | Age: 69
End: 2024-11-06

## 2024-11-06 VITALS
OXYGEN SATURATION: 97 % | BODY MASS INDEX: 26.44 KG/M2 | SYSTOLIC BLOOD PRESSURE: 122 MMHG | HEART RATE: 102 BPM | DIASTOLIC BLOOD PRESSURE: 80 MMHG | WEIGHT: 149.2 LBS | HEIGHT: 63 IN

## 2024-11-06 DIAGNOSIS — E78.00 HYPERCHOLESTEROLEMIA: ICD-10-CM

## 2024-11-06 DIAGNOSIS — Z00.00 INITIAL MEDICARE ANNUAL WELLNESS VISIT: Primary | ICD-10-CM

## 2024-11-06 DIAGNOSIS — R09.A2 FOREIGN BODY SENSATION IN THROAT: ICD-10-CM

## 2024-11-06 DIAGNOSIS — Z71.85 VACCINE COUNSELING: ICD-10-CM

## 2024-11-06 DIAGNOSIS — R00.0 TACHYCARDIA: ICD-10-CM

## 2024-11-06 ASSESSMENT — PATIENT HEALTH QUESTIONNAIRE - PHQ9
SUM OF ALL RESPONSES TO PHQ QUESTIONS 1-9: 0
2. FEELING DOWN, DEPRESSED OR HOPELESS: NOT AT ALL
SUM OF ALL RESPONSES TO PHQ QUESTIONS 1-9: 0
SUM OF ALL RESPONSES TO PHQ9 QUESTIONS 1 & 2: 0
1. LITTLE INTEREST OR PLEASURE IN DOING THINGS: NOT AT ALL

## 2024-11-06 NOTE — PROGRESS NOTES
Medicare Annual Wellness Visit    Kortney Arriaga is here for Medicare AWV    Assessment & Plan   1. Initial Medicare annual wellness visit    2. Foreign body sensation in throat  - [facility]    3. Hypercholesterolemia  - Comprehensive Metabolic Panel; Future  - Lipid, Fasting; Future  - High sensitivity CRP; Future  - Lipoprotein A (LPA); Future    4. Tachycardia  - CBC with Auto Differential; Future  -Has a smart watch and she said her normal pulse is under 100.    5.  Vaccine counseling  - Patient declines vaccines today including flu and pneumococcal vaccine.  She seems hesitant to have any vaccine.  I gave her information on the after visit summary about vaccines.        Recommendations for Preventive Services Due: see orders and patient instructions/AVS.  Recommended screening schedule for the next 5-10 years is provided to the patient in written form: see Patient Instructions/AVS.     Return in about 1 year (around 11/6/2025) for [facility] AWV.       Subjective   The following acute and/or chronic problems were also addressed today:  For about the past year, she has had a feeling of something stuck in the left side of the throat.  It is annoying but not severe.  It feels about the level of the mid throat.  Points lateral to area of thyroid.  No food catching or choking.    FU of HLP with high HDL.    Patient's complete Health Risk Assessment and screening values have been reviewed and are found in Flowsheets. The following problems were reviewed today and where indicated follow up appointments were made and/or referrals ordered.    Positive Risk Factor Screenings with Interventions:                    Safety:  Do you have any tripping hazards - loose or unsecured carpets or rugs?: (!) Yes  Do you have non-slip mats or non-slip surfaces or shower bars or grab bars in your shower or bathtub?: (!) No    Interventions:  Patient comments: she has rubber backings under the rugs.  Step

## 2024-11-06 NOTE — PATIENT INSTRUCTIONS
4 low-dose aspirin. Wait for an ambulance. Do not try to drive yourself.  Watch closely for changes in your health, and be sure to contact your doctor if you have any problems.  Where can you learn more?  Go to https://www.[employer].net/patientEd and enter F075 to learn more about \"A Healthy Heart: Care Instructions.\"  Current as of: June 24, 2023  Content Version: 14.2  © 2024 [employer].   Care instructions adapted under license by [employer]. If you have questions about a medical condition or this instruction, always ask your healthcare professional. [employer], [employer] disclaims any warranty or liability for your use of this information.      Personalized Preventive Plan for Kortney Arriaga - 11/6/2024  Medicare offers a range of preventive health benefits. Some of the tests and screenings are paid in full while other may be subject to a deductible, co-insurance, and/or copay.    Some of these benefits include a comprehensive review of your medical history including lifestyle, illnesses that may run in your family, and various assessments and screenings as appropriate.    After reviewing your medical record and screening and assessments performed today your provider may have ordered immunizations, labs, imaging, and/or referrals for you.  A list of these orders (if applicable) as well as your Preventive Care list are included within your After Visit Summary for your review.    Other Preventive Recommendations:    A preventive eye exam performed by an eye specialist is recommended every 1-2 years to screen for glaucoma; cataracts, macular degeneration, and other eye disorders.  A preventive dental visit is recommended every 6 months.  Try to get at least 150 minutes of exercise per week or 10,000 steps per day on a pedometer .  Order or download the FREE \"Exercise & Physical Activity: Your Everyday Guide\" from [employer]. Call 1-463.877.3752 or search The National

## 2025-01-14 DIAGNOSIS — E78.00 HYPERCHOLESTEROLEMIA: ICD-10-CM

## 2025-01-14 DIAGNOSIS — R00.0 TACHYCARDIA: ICD-10-CM

## 2025-01-14 LAB
ALBUMIN SERPL-MCNC: 4.5 G/DL (ref 3.4–5)
ALBUMIN/GLOB SERPL: 2 {RATIO} (ref 1.1–2.2)
ALP SERPL-CCNC: 52 U/L (ref 40–129)
ALT SERPL-CCNC: 22 U/L (ref 10–40)
ANION GAP SERPL CALCULATED.3IONS-SCNC: 9 MMOL/L (ref 3–16)
AST SERPL-CCNC: 24 U/L (ref 15–37)
BILIRUB SERPL-MCNC: 0.3 MG/DL (ref 0–1)
BUN SERPL-MCNC: 14 MG/DL (ref 7–20)
CALCIUM SERPL-MCNC: 10 MG/DL (ref 8.3–10.6)
CHLORIDE SERPL-SCNC: 103 MMOL/L (ref 99–110)
CHOLEST SERPL-MCNC: 291 MG/DL (ref 0–199)
CO2 SERPL-SCNC: 26 MMOL/L (ref 21–32)
CREAT SERPL-MCNC: 0.9 MG/DL (ref 0.6–1.2)
CRP SERPL HS-MCNC: 2.12 MG/L (ref 0.16–3)
GFR SERPLBLD CREATININE-BSD FMLA CKD-EPI: 70 ML/MIN/{1.73_M2}
GLUCOSE SERPL-MCNC: 95 MG/DL (ref 70–99)
HDLC SERPL-MCNC: 77 MG/DL (ref 40–60)
LDL CHOLESTEROL: 187 MG/DL
POTASSIUM SERPL-SCNC: 4.3 MMOL/L (ref 3.5–5.1)
PROT SERPL-MCNC: 6.7 G/DL (ref 6.4–8.2)
SODIUM SERPL-SCNC: 138 MMOL/L (ref 136–145)
TRIGL SERPL-MCNC: 133 MG/DL (ref 0–150)
VLDLC SERPL CALC-MCNC: 27 MG/DL

## 2025-01-15 LAB
BASOPHILS # BLD: 0 K/UL (ref 0–0.2)
BASOPHILS NFR BLD: 0.5 %
DEPRECATED RDW RBC AUTO: 13.3 % (ref 12.4–15.4)
EOSINOPHIL # BLD: 0.1 K/UL (ref 0–0.6)
EOSINOPHIL NFR BLD: 3.2 %
HCT VFR BLD AUTO: 45.1 % (ref 36–48)
HGB BLD-MCNC: 15 G/DL (ref 12–16)
LYMPHOCYTES # BLD: 1.2 K/UL (ref 1–5.1)
LYMPHOCYTES NFR BLD: 29.6 %
MCH RBC QN AUTO: 29.9 PG (ref 26–34)
MCHC RBC AUTO-ENTMCNC: 33.3 G/DL (ref 31–36)
MCV RBC AUTO: 89.7 FL (ref 80–100)
MONOCYTES # BLD: 0.3 K/UL (ref 0–1.3)
MONOCYTES NFR BLD: 7.2 %
NEUTROPHILS # BLD: 2.4 K/UL (ref 1.7–7.7)
NEUTROPHILS NFR BLD: 59.5 %
PLATELET # BLD AUTO: 226 K/UL (ref 135–450)
PMV BLD AUTO: 9.7 FL (ref 5–10.5)
RBC # BLD AUTO: 5.03 M/UL (ref 4–5.2)
WBC # BLD AUTO: 4.1 K/UL (ref 4–11)

## 2025-01-16 DIAGNOSIS — E78.00 HYPERCHOLESTEROLEMIA: Primary | ICD-10-CM

## 2025-01-16 LAB — LPA SERPL-MCNC: 10 MG/DL

## 2025-01-16 RX ORDER — ROSUVASTATIN CALCIUM 20 MG/1
20 TABLET, COATED ORAL DAILY
Qty: 90 TABLET | Refills: 1 | Status: SHIPPED | OUTPATIENT
Start: 2025-01-16

## 2025-01-16 NOTE — RESULT ENCOUNTER NOTE
Your lipid profile is not good.  It got worse.  Your non-HDL cholesterol is far from goal.  Your non-HDL cholesterol is 224.  It should be under 130.  I strongly recommend that you start cholesterol medicine at your age.  This high cholesterol is probably genetic, but fortunately you did not inherit the lipoprotein A gene.  Other labs are normal.

## 2025-01-20 NOTE — PROGRESS NOTES
DATE _1/27/2025__      PLACE ___ [address]                          TIME 0945___                                             __x_ [address]                           Arrival _1015__                                                                                                                                                                                                        Surgeons office to give arrival time _____                                                                                                 If you have not received time contact your surgeon.                                                                                                                              Surgery dates,times and arrival times are subject to change.Please check with your surgeon.  Bring a photo I.D.,insurance card and form of payment if you have a co pay.  Plan for someone 18 years or older to stay on site while you are her and to take you home after surgery.You are not permitted to drive yourself home. You cannot leave via Uber, Lyft, Taxi or Medical Transport by yourself. You must have someone with you. It is strongly suggested that someone stay with you the first 24 hours after anesthesia. Your surgery will be cancelled if you do not have a ride home. A parent or legal guardian guardian must stay with a child until the child is discharged. Please do not bring other children.  A History/Physical is required to be completed and dated within 30 days of your surgery. To be done by PCP __ Surgeon ___or Hospitalist __x_  You may be required to get labs __ EKG __ or a chest Xray ___ prior to surgery. To be done by ____  Nothing to eat or drink after midnight the evening prior to surgery.  If your surgeon requires a bowel prep, follow their instructions.  You may brush your teeth.  Bring a complete list of your medications including vitamins and supplement with the name,dose and frequency.  Take the following

## 2025-01-27 ENCOUNTER — ANESTHESIA (OUTPATIENT)
Dept: OPERATING ROOM | Age: 70
End: 2025-01-27
Payer: MEDICARE

## 2025-01-27 ENCOUNTER — ANESTHESIA EVENT (OUTPATIENT)
Dept: OPERATING ROOM | Age: 70
End: 2025-01-27
Payer: MEDICARE

## 2025-01-27 ENCOUNTER — HOSPITAL ENCOUNTER (OUTPATIENT)
Age: 70
Setting detail: OUTPATIENT SURGERY
Discharge: HOME | End: 2025-01-27
Attending: PODIATRIST | Admitting: PODIATRIST
Payer: MEDICARE

## 2025-01-27 ENCOUNTER — APPOINTMENT (OUTPATIENT)
Dept: GENERAL RADIOLOGY | Age: 70
End: 2025-01-27
Attending: PODIATRIST
Payer: MEDICARE

## 2025-01-27 VITALS
SYSTOLIC BLOOD PRESSURE: 121 MMHG | RESPIRATION RATE: 15 BRPM | OXYGEN SATURATION: 100 % | HEIGHT: 63 IN | HEART RATE: 67 BPM | DIASTOLIC BLOOD PRESSURE: 63 MMHG | WEIGHT: 154 LBS | TEMPERATURE: 97.5 F | BODY MASS INDEX: 27.29 KG/M2

## 2025-01-27 DIAGNOSIS — G89.18 POST-OP PAIN: Primary | ICD-10-CM

## 2025-01-27 PROCEDURE — 3600000004 HC SURGERY LEVEL 4 BASE: Performed by: PODIATRIST

## 2025-01-27 PROCEDURE — 7100000011 HC PHASE II RECOVERY - ADDTL 15 MIN: Performed by: PODIATRIST

## 2025-01-27 PROCEDURE — 7100000001 HC PACU RECOVERY - ADDTL 15 MIN: Performed by: PODIATRIST

## 2025-01-27 PROCEDURE — C1713 ANCHOR/SCREW BN/BN,TIS/BN: HCPCS | Performed by: PODIATRIST

## 2025-01-27 PROCEDURE — 6370000000 HC RX 637 (ALT 250 FOR IP): Performed by: ANESTHESIOLOGY

## 2025-01-27 PROCEDURE — 6360000002 HC RX W HCPCS: Performed by: PODIATRIST

## 2025-01-27 PROCEDURE — 3700000000 HC ANESTHESIA ATTENDED CARE: Performed by: PODIATRIST

## 2025-01-27 PROCEDURE — 2709999900 HC NON-CHARGEABLE SUPPLY: Performed by: PODIATRIST

## 2025-01-27 PROCEDURE — 7100000010 HC PHASE II RECOVERY - FIRST 15 MIN: Performed by: PODIATRIST

## 2025-01-27 PROCEDURE — 7100000000 HC PACU RECOVERY - FIRST 15 MIN: Performed by: PODIATRIST

## 2025-01-27 PROCEDURE — 3700000001 HC ADD 15 MINUTES (ANESTHESIA): Performed by: PODIATRIST

## 2025-01-27 PROCEDURE — 2500000003 HC RX 250 WO HCPCS: Performed by: PODIATRIST

## 2025-01-27 PROCEDURE — 3600000014 HC SURGERY LEVEL 4 ADDTL 15MIN: Performed by: PODIATRIST

## 2025-01-27 PROCEDURE — 6360000002 HC RX W HCPCS: Performed by: NURSE ANESTHETIST, CERTIFIED REGISTERED

## 2025-01-27 PROCEDURE — 73630 X-RAY EXAM OF FOOT: CPT

## 2025-01-27 DEVICE — C-WIRE PAK DOUBLE ENDED ORTHOPAEDIC WIRE, SPADE, .062" (1.57 MM)
Type: IMPLANTABLE DEVICE | Site: FOOT | Status: FUNCTIONAL
Brand: C-WIRE

## 2025-01-27 RX ORDER — OXYCODONE HYDROCHLORIDE 5 MG/1
5 TABLET ORAL
Status: DISCONTINUED | OUTPATIENT
Start: 2025-01-27 | End: 2025-01-27 | Stop reason: HOSPADM

## 2025-01-27 RX ORDER — MEPERIDINE HYDROCHLORIDE 25 MG/ML
12.5 INJECTION INTRAMUSCULAR; INTRAVENOUS; SUBCUTANEOUS EVERY 5 MIN PRN
Status: DISCONTINUED | OUTPATIENT
Start: 2025-01-27 | End: 2025-01-27 | Stop reason: HOSPADM

## 2025-01-27 RX ORDER — NALOXONE HYDROCHLORIDE 0.4 MG/ML
INJECTION, SOLUTION INTRAMUSCULAR; INTRAVENOUS; SUBCUTANEOUS PRN
Status: DISCONTINUED | OUTPATIENT
Start: 2025-01-27 | End: 2025-01-27 | Stop reason: HOSPADM

## 2025-01-27 RX ORDER — PROPOFOL 10 MG/ML
INJECTION, EMULSION INTRAVENOUS
Status: DISCONTINUED | OUTPATIENT
Start: 2025-01-27 | End: 2025-01-27 | Stop reason: SDUPTHER

## 2025-01-27 RX ORDER — HYDROMORPHONE HYDROCHLORIDE 2 MG/ML
0.5 INJECTION, SOLUTION INTRAMUSCULAR; INTRAVENOUS; SUBCUTANEOUS EVERY 5 MIN PRN
Status: DISCONTINUED | OUTPATIENT
Start: 2025-01-27 | End: 2025-01-27 | Stop reason: HOSPADM

## 2025-01-27 RX ORDER — SODIUM CHLORIDE 9 MG/ML
INJECTION, SOLUTION INTRAVENOUS CONTINUOUS
Status: DISCONTINUED | OUTPATIENT
Start: 2025-01-27 | End: 2025-01-27 | Stop reason: HOSPADM

## 2025-01-27 RX ORDER — OXYCODONE HYDROCHLORIDE 5 MG/1
10 TABLET ORAL PRN
Status: DISCONTINUED | OUTPATIENT
Start: 2025-01-27 | End: 2025-01-27 | Stop reason: HOSPADM

## 2025-01-27 RX ORDER — OXYCODONE AND ACETAMINOPHEN 5; 325 MG/1; MG/1
1 TABLET ORAL EVERY 6 HOURS PRN
Qty: 28 TABLET | Refills: 0 | Status: SHIPPED | OUTPATIENT
Start: 2025-01-27 | End: 2025-02-03

## 2025-01-27 RX ORDER — HYDROMORPHONE HYDROCHLORIDE 2 MG/ML
0.25 INJECTION, SOLUTION INTRAMUSCULAR; INTRAVENOUS; SUBCUTANEOUS EVERY 5 MIN PRN
Status: DISCONTINUED | OUTPATIENT
Start: 2025-01-27 | End: 2025-01-27 | Stop reason: HOSPADM

## 2025-01-27 RX ORDER — SODIUM CHLORIDE 0.9 % (FLUSH) 0.9 %
5-40 SYRINGE (ML) INJECTION EVERY 12 HOURS SCHEDULED
Status: DISCONTINUED | OUTPATIENT
Start: 2025-01-27 | End: 2025-01-27 | Stop reason: HOSPADM

## 2025-01-27 RX ORDER — ACETAMINOPHEN 500 MG
1000 TABLET ORAL ONCE
Status: COMPLETED | OUTPATIENT
Start: 2025-01-27 | End: 2025-01-27

## 2025-01-27 RX ORDER — PROCHLORPERAZINE EDISYLATE 5 MG/ML
5 INJECTION INTRAMUSCULAR; INTRAVENOUS
Status: DISCONTINUED | OUTPATIENT
Start: 2025-01-27 | End: 2025-01-27 | Stop reason: HOSPADM

## 2025-01-27 RX ORDER — TRAMADOL HYDROCHLORIDE 50 MG/1
50 TABLET ORAL ONCE
Status: COMPLETED | OUTPATIENT
Start: 2025-01-27 | End: 2025-01-27

## 2025-01-27 RX ORDER — LIDOCAINE HYDROCHLORIDE 20 MG/ML
INJECTION, SOLUTION EPIDURAL; INFILTRATION; INTRACAUDAL; PERINEURAL
Status: DISCONTINUED | OUTPATIENT
Start: 2025-01-27 | End: 2025-01-27 | Stop reason: SDUPTHER

## 2025-01-27 RX ORDER — LIDOCAINE HYDROCHLORIDE 10 MG/ML
0.5 INJECTION, SOLUTION EPIDURAL; INFILTRATION; INTRACAUDAL; PERINEURAL ONCE
Status: DISCONTINUED | OUTPATIENT
Start: 2025-01-27 | End: 2025-01-27 | Stop reason: HOSPADM

## 2025-01-27 RX ORDER — IPRATROPIUM BROMIDE AND ALBUTEROL SULFATE 2.5; .5 MG/3ML; MG/3ML
1 SOLUTION RESPIRATORY (INHALATION)
Status: DISCONTINUED | OUTPATIENT
Start: 2025-01-27 | End: 2025-01-27 | Stop reason: HOSPADM

## 2025-01-27 RX ORDER — BUPIVACAINE HYDROCHLORIDE 5 MG/ML
INJECTION, SOLUTION EPIDURAL; INTRACAUDAL
Status: COMPLETED | OUTPATIENT
Start: 2025-01-27 | End: 2025-01-27

## 2025-01-27 RX ORDER — SODIUM CHLORIDE 0.9 % (FLUSH) 0.9 %
5-40 SYRINGE (ML) INJECTION PRN
Status: DISCONTINUED | OUTPATIENT
Start: 2025-01-27 | End: 2025-01-27 | Stop reason: HOSPADM

## 2025-01-27 RX ORDER — ONDANSETRON 2 MG/ML
4 INJECTION INTRAMUSCULAR; INTRAVENOUS
Status: DISCONTINUED | OUTPATIENT
Start: 2025-01-27 | End: 2025-01-27 | Stop reason: HOSPADM

## 2025-01-27 RX ORDER — SODIUM CHLORIDE 9 MG/ML
INJECTION, SOLUTION INTRAVENOUS PRN
Status: DISCONTINUED | OUTPATIENT
Start: 2025-01-27 | End: 2025-01-27 | Stop reason: HOSPADM

## 2025-01-27 RX ORDER — MIDAZOLAM HYDROCHLORIDE 1 MG/ML
INJECTION, SOLUTION INTRAMUSCULAR; INTRAVENOUS
Status: DISCONTINUED | OUTPATIENT
Start: 2025-01-27 | End: 2025-01-27 | Stop reason: SDUPTHER

## 2025-01-27 RX ADMIN — MIDAZOLAM 2 MG: 1 INJECTION INTRAMUSCULAR; INTRAVENOUS at 08:43

## 2025-01-27 RX ADMIN — PROPOFOL 120 MCG/KG/MIN: 10 INJECTION, EMULSION INTRAVENOUS at 08:47

## 2025-01-27 RX ADMIN — CEFAZOLIN 2000 MG: 2 INJECTION, POWDER, FOR SOLUTION INTRAMUSCULAR; INTRAVENOUS at 08:50

## 2025-01-27 RX ADMIN — TRAMADOL HYDROCHLORIDE 50 MG: 50 TABLET ORAL at 08:37

## 2025-01-27 RX ADMIN — ACETAMINOPHEN 1000 MG: 500 TABLET ORAL at 08:37

## 2025-01-27 RX ADMIN — LIDOCAINE HYDROCHLORIDE 100 MG: 20 INJECTION, SOLUTION EPIDURAL; INFILTRATION; INTRACAUDAL; PERINEURAL at 08:47

## 2025-01-27 RX ADMIN — OXYCODONE 5 MG: 5 TABLET ORAL at 10:03

## 2025-01-27 RX ADMIN — PROPOFOL 100 MG: 10 INJECTION, EMULSION INTRAVENOUS at 08:47

## 2025-01-27 ASSESSMENT — PAIN SCALES - GENERAL: PAINLEVEL_OUTOF10: 4

## 2025-01-27 ASSESSMENT — PAIN - FUNCTIONAL ASSESSMENT
PAIN_FUNCTIONAL_ASSESSMENT: WONG-BAKER FACES
PAIN_FUNCTIONAL_ASSESSMENT: 0-10

## 2025-01-27 ASSESSMENT — PAIN DESCRIPTION - DESCRIPTORS
DESCRIPTORS: ACHING;DULL
DESCRIPTORS: DISCOMFORT;ACHING

## 2025-01-27 ASSESSMENT — PAIN DESCRIPTION - ORIENTATION: ORIENTATION: RIGHT

## 2025-01-27 ASSESSMENT — PAIN DESCRIPTION - LOCATION: LOCATION: TOE (COMMENT WHICH ONE)

## 2025-01-27 NOTE — H&P
[facility]   HISTORY AND PHYSICAL      I am seeing YOLA ANGEL at the request of Dr Morgan for evaluation of the patient's medical problems prior to interphalangeal joint fusion    1/27/2025 8:31 AM    Patient Information:  YOLA ANGEL is a 69 y.o. female [id number]  PCP:  Keeley Velasco MD (Tel: [phone] )    Chief complaint:  R foot pain, toe deformity      History of Present Illness:  Yola Angel is a 69 y.o. female who presented with R foot pain. Symptom onset was gradual for a time period of 3 years. The severity is described as severe. The course of her symptoms over time is worsening. The symptoms improved with rest and worsened with ambulation, wearing shoes. The patient's symptom is associated with toe deformity. Patient is very active and likes to hike. She is limited by her hammer toes. She is otherwise healthy, denies diabetes, cad, copd. No cp or sob, fevers, or recent illness.       REVIEW OF SYSTEMS:   Constitutional:  Negative for fever,chills or night sweats  ENT:  Negative for rhinorrhea, epistaxis, hoarseness, sore throat.  Respiratory:   Negative for shortness of breath,wheezing  Cardiovascular:   Negative for  chest pain, palpitations   Gastrointestinal:  Negative for nausea, vomiting, diarrhea  Genitourinary:  Negative for polyuria, dysuria   Hematologic/Lymphatic:  Negative for  bleeding tendency, easy bruising  Neurologic:  Negative for  confusion,dysarthria.  Skin:  Negative for itching,rash  Psychiatric:  Negative for depression,anxiety, agitation.  Endocrine:  Negative for polydipsia,polyuria,heat /cold intolerance.    Past Medical History:   has a past medical history of Hearing loss, Hip pain, Hyperlipidemia, Insomnia, unspecified, Pelvic relaxation due to uterovaginal prolapse, incomplete, PMS (premenstrual syndrome), and Seasonal allergies.     Past Surgical

## 2025-01-27 NOTE — ANESTHESIA PRE PROCEDURE
Department of Anesthesiology  Preprocedure Note       Name:  Kortney Arriaga   Age:  69 y.o.  :  1955                                          MRN:  [id number]         Date:  2025      Surgeon: Surgeon(s):  Jakob Morgan DPM    Procedure: Procedure(s):  PROXIMAL INTERPHALANGEAL JOINT FUSION SECOND AND THIRD TOES, RIGHT FOOT; FLEXOR TENOTOMY FOURTH TOE RIGHT FOOT    Medications prior to admission:   Prior to Admission medications    Medication Sig Start Date End Date Taking? Authorizing Provider   rosuvastatin (CRESTOR) 20 MG tablet Take 1 tablet by mouth daily 25   Keeley Velasco MD       Current medications:    Current Facility-Administered Medications   Medication Dose Route Frequency Provider Last Rate Last Admin   • ceFAZolin (ANCEF) 2,000 mg in sterile water 20 mL IV syringe  2,000 mg IntraVENous On Call to OR Jakob Morgan DPM       • 0.9 % sodium chloride infusion   IntraVENous Continuous Jakob Morgan DPM       • lidocaine PF 1 % injection 0.5 mL  0.5 mL IntraDERmal Once Jakob Morgan DPM       • 0.9 % sodium chloride infusion   IntraVENous Continuous Jakob Morgan DPM           Allergies:    Allergies   Allergen Reactions   • Ambien [Zolpidem Tartrate] Other (See Comments)     HALLUCINATIONS       Problem List:    Patient Active Problem List   Diagnosis Code   • Elevated BP without diagnosis of hypertension R03.0       Past Medical History:        Diagnosis Date   • Hearing loss     SLIGHT IN RIGHT EAR   • Hip pain     chronic   • Hyperlipidemia    • Insomnia, unspecified    • Pelvic relaxation due to uterovaginal prolapse, incomplete 2011    had hysterectomy for this reason   • PMS (premenstrual syndrome)    • Seasonal allergies        Past Surgical History:        Procedure Laterality Date   • BUNIONECTOMY  2002    right   Dr Riggs   • COLONOSCOPY  age 52    benign polyp; repeat clear   • HYSTERECTOMY, VAGINAL  2011

## 2025-01-27 NOTE — ANESTHESIA POSTPROCEDURE EVALUATION
Department of Anesthesiology  Postprocedure Note    Patient: Kortney Arriaga  MRN: [id number]  YOB: 1955  Date of evaluation: 1/27/2025    Procedure Summary       Date: 01/27/25 Room / Location: [address]    Anesthesia Start: 0843 Anesthesia Stop: 0933    Procedure: PROXIMAL INTERPHALANGEAL JOINT FUSION SECOND AND THIRD TOES, RIGHT FOOT; FLEXOR TENOTOMY FOURTH TOE RIGHT FOOT (Right: Toes) Diagnosis:       Other hammer toe(s) (acquired), right foot      (Other hammer toe(s) (acquired), right foot [M20.41])    Surgeons: Jakob Morgan DPM Responsible Provider: Fran Early MD    Anesthesia Type: general, MAC ASA Status: 2            Anesthesia Type: No value filed.    Falguni Phase I: Falguni Score: 10    Falguni Phase II: Falguni Score: 10    Anesthesia Post Evaluation    Patient location during evaluation: PACU  Patient participation: complete - patient participated  Level of consciousness: awake and alert  Airway patency: patent  Nausea & Vomiting: no nausea and no vomiting  Cardiovascular status: hemodynamically stable  Respiratory status: acceptable  Hydration status: euvolemic  Pain management: adequate    No notable events documented.

## 2025-01-27 NOTE — BRIEF OP NOTE
Brief Postoperative Note      Patient: Kortney Arriaga  YOB: 1955  MRN: [id number]    Date of Procedure: 1/27/2025    Pre-Op Diagnosis Codes:      * Other hammer toe(s) (acquired), right foot [M20.41]    Post-Op Diagnosis: Same       Procedure(s):  PROXIMAL INTERPHALANGEAL JOINT FUSION SECOND AND THIRD TOES, RIGHT FOOT; FLEXOR TENOTOMY FOURTH TOE RIGHT FOOT    Surgeon(s):  Jakob Morgan DPM    Assistant:  Resident: Ab Braun DPM    Anesthesia: Monitor Anesthesia Care    Hemostasis: anatomic dissection and electrocautery, pneumatic ankle tourniquet at 250 for 32 minutes    Injectables: Pre-Op 10 cc of 1:1 mixture of 5 cc Lidocaine plain and 5 cc of 0.5 % Marcaine plain postop 10 cc 0.5% Marcaine plain    Materials: 4-0 Vicryl, 4-0 Nylon    Implants: x2 0.062 K wire    Estimated Blood Loss (mL): Minimal    Complications: None    Specimens:   * No specimens in log *    Implants:  Implant Name Type Inv. Item Serial No.  Lot No. LRB No. Used Action   C WIRE FIX L5IN DIA0.062IN DBL END SPADE TIP - [id number]  C WIRE FIX L5IN DIA0.062IN DBL END SPADE TIP  [facility] [employer] [id number] Right 2 Implanted         Drains: * No LDAs found *    Findings:  Infection Present At Time Of Surgery (PATOS) (choose all levels that have infection present):  No infection present  Other Findings: Satisfactory correction of hammertoe deformities 2, 3, 4    Ab Braun DPM   Podiatric Resident PGY-2  Pager [phone] or PerfectServe  1/27/2025, 9:33 AM

## 2025-01-27 NOTE — OP NOTE
Operative Note      Patient: Kortney Arriaga  YOB: 1955  MRN: [id number]    Date of Procedure: 1/27/2025    Pre-Op Diagnosis Codes:      * Other hammer toe(s) (acquired), right foot [M20.41]    Post-Op Diagnosis: Same       Procedure(s):  PROXIMAL INTERPHALANGEAL JOINT FUSION SECOND AND THIRD TOES, RIGHT FOOT; FLEXOR TENOTOMY FOURTH TOE RIGHT FOOT    Surgeon(s):  Jakob Morgan DPM    Assistant:   Resident: Ab Braun DPM    Anesthesia: Monitor Anesthesia Care      Hemostasis: anatomic dissection and electrocautery, pneumatic ankle tourniquet at 250 for 32 minutes    Injectables: Pre-Op 10 cc of 1:1 mixture of 5 cc Lidocaine plain and 5 cc of 0.5 % Marcaine plain and postop 10 cc 0.5% Marcaine plain    Materials: 4-0 Vicryl, 4-0 Nylon    Implants: x2 0.062 k wire      Estimated Blood Loss (mL): Minimal    Complications: None    Specimens:   * No specimens in log *    Implants:  Implant Name Type Inv. Item Serial No.  Lot No. LRB No. Used Action   C WIRE FIX L5IN DIA0.062IN DBL END SPADE TIP - [id number]  C WIRE FIX L5IN DIA0.062IN DBL END SPADE TIP  [facility] [employer] [id number] Right 2 Implanted         Drains: * No LDAs found *    Findings:  Infection Present At Time Of Surgery (PATOS) (choose all levels that have infection present):  No infection present  Other Findings: Satisfactory correction of hammertoes 2, 3, 4    Detailed Description of Procedure:   INDICATIONS FOR PROCEDURE: This patient has signs and symptoms clinically and radiographically consistent with the above mentioned preoperative diagnosis. Having failed conservative treatment, it was determined that the patient would benefit from surgical intervention. All potential risks, benefits, and complications were discussed with the patient prior to the scheduling of surgery. All the patient's questions were answered and no guarantees were given. The patient wished to proceed with surgery, and

## 2025-01-27 NOTE — DISCHARGE INSTRUCTIONS
progressive drainage from surgical area    Follow up - Call Dr. Jakob Morgan office for a follow-up appointment       GENERAL SURGERY DISCHARGE INSTRUCTIONS    Follow your surgeons instructions.  Follow up with your surgeon as directed.  Observe the operative area for signs of excessive bleeding.If needed apply pressure,elevate if able and contact your surgeon.  Observe the operative site for any signs of infection- such as increased pain,redness,fever greater than 101 degrees,swelling, foul odor or drainage.Contact your surgeon if any of these symptoms are present.  Keep operative site clean and dry.  Do not remove dressing unless instructed to by surgeon.  Apply ice as directed.  If unable to urinate once you are at home,  notify your surgeon or go to the Emergency Room.  Avoid pulling,pushing or tugging to suture line.  If you become short of breath call your doctor or go to the ER.  Take medications as directed.  Pain medication should be taken with food.  Do not drive or operate machinery while taking narcotics.  For any problems or question call your surgeon.       ANESTHESIA DISCHARGE INSTRUCTIONS    Wear your seatbelt home.  You are under the influence of drugs-do not drink alcohol,drive,operate machinery,or make any important decisions or sign any legal documentsfor 24 hours  A responsible adult needs to be with you for 24 hours.  You may experience lightheadedness,dizziness,or sleepiness following surgery.  Rest at home today- increase activity as tolerated.  Progress slowly to a regular diet unless your physician has instructed you otherwise.Drink plenty of water.  If nausea becomes a problem call your physician.  Coughing,sore throat,and muscle aches are other side effects of anesthesia,and should improve with time.  Do not drive,operate machinery while taking narcotics.

## 2025-02-11 ENCOUNTER — APPOINTMENT (OUTPATIENT)
Dept: CT IMAGING | Age: 70
End: 2025-02-11
Payer: MEDICARE

## 2025-02-11 ENCOUNTER — HOSPITAL ENCOUNTER (OUTPATIENT)
Age: 70
Setting detail: OBSERVATION
Discharge: HOME | End: 2025-02-12
Attending: EMERGENCY MEDICINE | Admitting: INTERNAL MEDICINE
Payer: MEDICARE

## 2025-02-11 ENCOUNTER — APPOINTMENT (OUTPATIENT)
Dept: GENERAL RADIOLOGY | Age: 70
End: 2025-02-11
Payer: MEDICARE

## 2025-02-11 DIAGNOSIS — I20.9 ANGINA PECTORIS (HCC): ICD-10-CM

## 2025-02-11 DIAGNOSIS — R07.9 CHEST PAIN, UNSPECIFIED TYPE: Primary | ICD-10-CM

## 2025-02-11 LAB
ALBUMIN SERPL-MCNC: 4.4 G/DL (ref 3.4–5)
ALBUMIN/GLOB SERPL: 1.5 {RATIO} (ref 1.1–2.2)
ALP SERPL-CCNC: 55 U/L (ref 40–129)
ALT SERPL-CCNC: 19 U/L (ref 10–40)
ANION GAP SERPL CALCULATED.3IONS-SCNC: 12 MMOL/L (ref 3–16)
AST SERPL-CCNC: 23 U/L (ref 15–37)
BASOPHILS # BLD: 0 K/UL (ref 0–0.2)
BASOPHILS NFR BLD: 0.3 %
BILIRUB SERPL-MCNC: <0.2 MG/DL (ref 0–1)
BILIRUB UR QL STRIP.AUTO: NEGATIVE
BUN SERPL-MCNC: 15 MG/DL (ref 7–20)
CALCIUM SERPL-MCNC: 9.6 MG/DL (ref 8.3–10.6)
CHLORIDE SERPL-SCNC: 102 MMOL/L (ref 99–110)
CLARITY UR: CLEAR
CO2 SERPL-SCNC: 26 MMOL/L (ref 21–32)
COLOR UR: YELLOW
CREAT SERPL-MCNC: 0.9 MG/DL (ref 0.6–1.2)
CRP SERPL-MCNC: 13.3 MG/L (ref 0–5.1)
DEPRECATED RDW RBC AUTO: 12.9 % (ref 12.4–15.4)
EOSINOPHIL # BLD: 0.1 K/UL (ref 0–0.6)
EOSINOPHIL NFR BLD: 0.8 %
ERYTHROCYTE [SEDIMENTATION RATE] IN BLOOD BY WESTERGREN METHOD: 17 MM/HR (ref 0–30)
GFR SERPLBLD CREATININE-BSD FMLA CKD-EPI: 70 ML/MIN/{1.73_M2}
GLUCOSE SERPL-MCNC: 123 MG/DL (ref 70–99)
GLUCOSE UR STRIP.AUTO-MCNC: NEGATIVE MG/DL
HCT VFR BLD AUTO: 45 % (ref 36–48)
HGB BLD-MCNC: 14.9 G/DL (ref 12–16)
HGB UR QL STRIP.AUTO: NEGATIVE
KETONES UR STRIP.AUTO-MCNC: ABNORMAL MG/DL
LACTATE BLDV-SCNC: 1 MMOL/L (ref 0.4–2)
LEUKOCYTE ESTERASE UR QL STRIP.AUTO: NEGATIVE
LIPASE SERPL-CCNC: 32 U/L (ref 13–60)
LYMPHOCYTES # BLD: 1.2 K/UL (ref 1–5.1)
LYMPHOCYTES NFR BLD: 9.4 %
MCH RBC QN AUTO: 29.3 PG (ref 26–34)
MCHC RBC AUTO-ENTMCNC: 33.1 G/DL (ref 31–36)
MCV RBC AUTO: 88.6 FL (ref 80–100)
MONOCYTES # BLD: 0.7 K/UL (ref 0–1.3)
MONOCYTES NFR BLD: 5.8 %
NEUTROPHILS # BLD: 10.5 K/UL (ref 1.7–7.7)
NEUTROPHILS NFR BLD: 83.7 %
NITRITE UR QL STRIP.AUTO: NEGATIVE
NT-PROBNP SERPL-MCNC: 60 PG/ML (ref 0–124)
PH UR STRIP.AUTO: 5.5 [PH] (ref 5–8)
PLATELET # BLD AUTO: 255 K/UL (ref 135–450)
PMV BLD AUTO: 8.7 FL (ref 5–10.5)
POTASSIUM SERPL-SCNC: 3.8 MMOL/L (ref 3.5–5.1)
PROCALCITONIN SERPL IA-MCNC: 0.1 NG/ML (ref 0–0.15)
PROT SERPL-MCNC: 7.3 G/DL (ref 6.4–8.2)
PROT UR STRIP.AUTO-MCNC: NEGATIVE MG/DL
RBC # BLD AUTO: 5.08 M/UL (ref 4–5.2)
SODIUM SERPL-SCNC: 140 MMOL/L (ref 136–145)
SP GR UR STRIP.AUTO: >=1.03 (ref 1–1.03)
TROPONIN, HIGH SENSITIVITY: <6 NG/L (ref 0–14)
TROPONIN, HIGH SENSITIVITY: <6 NG/L (ref 0–14)
UA COMPLETE W REFLEX CULTURE PNL UR: ABNORMAL
UA DIPSTICK W REFLEX MICRO PNL UR: ABNORMAL
URN SPEC COLLECT METH UR: ABNORMAL
UROBILINOGEN UR STRIP-ACNC: 0.2 E.U./DL
WBC # BLD AUTO: 12.6 K/UL (ref 4–11)

## 2025-02-11 PROCEDURE — 6360000004 HC RX CONTRAST MEDICATION: Performed by: EMERGENCY MEDICINE

## 2025-02-11 PROCEDURE — 83690 ASSAY OF LIPASE: CPT

## 2025-02-11 PROCEDURE — G0378 HOSPITAL OBSERVATION PER HR: HCPCS

## 2025-02-11 PROCEDURE — 85025 COMPLETE CBC W/AUTO DIFF WBC: CPT

## 2025-02-11 PROCEDURE — 83880 ASSAY OF NATRIURETIC PEPTIDE: CPT

## 2025-02-11 PROCEDURE — 85652 RBC SED RATE AUTOMATED: CPT

## 2025-02-11 PROCEDURE — 6370000000 HC RX 637 (ALT 250 FOR IP): Performed by: EMERGENCY MEDICINE

## 2025-02-11 PROCEDURE — 93005 ELECTROCARDIOGRAM TRACING: CPT | Performed by: EMERGENCY MEDICINE

## 2025-02-11 PROCEDURE — 86140 C-REACTIVE PROTEIN: CPT

## 2025-02-11 PROCEDURE — 83605 ASSAY OF LACTIC ACID: CPT

## 2025-02-11 PROCEDURE — 99285 EMERGENCY DEPT VISIT HI MDM: CPT

## 2025-02-11 PROCEDURE — 96374 THER/PROPH/DIAG INJ IV PUSH: CPT

## 2025-02-11 PROCEDURE — 84484 ASSAY OF TROPONIN QUANT: CPT

## 2025-02-11 PROCEDURE — 87040 BLOOD CULTURE FOR BACTERIA: CPT

## 2025-02-11 PROCEDURE — 6360000002 HC RX W HCPCS: Performed by: EMERGENCY MEDICINE

## 2025-02-11 PROCEDURE — 71260 CT THORAX DX C+: CPT

## 2025-02-11 PROCEDURE — 84145 PROCALCITONIN (PCT): CPT

## 2025-02-11 PROCEDURE — 80053 COMPREHEN METABOLIC PANEL: CPT

## 2025-02-11 PROCEDURE — 81003 URINALYSIS AUTO W/O SCOPE: CPT

## 2025-02-11 PROCEDURE — 36415 COLL VENOUS BLD VENIPUNCTURE: CPT

## 2025-02-11 PROCEDURE — 6360000002 HC RX W HCPCS

## 2025-02-11 PROCEDURE — 71045 X-RAY EXAM CHEST 1 VIEW: CPT

## 2025-02-11 PROCEDURE — 96375 TX/PRO/DX INJ NEW DRUG ADDON: CPT

## 2025-02-11 RX ORDER — MORPHINE SULFATE 4 MG/ML
4 INJECTION, SOLUTION INTRAMUSCULAR; INTRAVENOUS
Status: COMPLETED | OUTPATIENT
Start: 2025-02-11 | End: 2025-02-11

## 2025-02-11 RX ORDER — ONDANSETRON 2 MG/ML
4 INJECTION INTRAMUSCULAR; INTRAVENOUS ONCE
Status: COMPLETED | OUTPATIENT
Start: 2025-02-11 | End: 2025-02-11

## 2025-02-11 RX ORDER — POLYETHYLENE GLYCOL 3350 17 G/17G
17 POWDER, FOR SOLUTION ORAL DAILY PRN
Status: DISCONTINUED | OUTPATIENT
Start: 2025-02-11 | End: 2025-02-12 | Stop reason: HOSPADM

## 2025-02-11 RX ORDER — LORAZEPAM 1 MG/1
1 TABLET ORAL ONCE
Status: COMPLETED | OUTPATIENT
Start: 2025-02-11 | End: 2025-02-11

## 2025-02-11 RX ORDER — SODIUM CHLORIDE 9 MG/ML
INJECTION, SOLUTION INTRAVENOUS PRN
Status: DISCONTINUED | OUTPATIENT
Start: 2025-02-11 | End: 2025-02-12 | Stop reason: HOSPADM

## 2025-02-11 RX ORDER — ACETAMINOPHEN 325 MG/1
650 TABLET ORAL EVERY 6 HOURS PRN
Status: DISCONTINUED | OUTPATIENT
Start: 2025-02-11 | End: 2025-02-12 | Stop reason: HOSPADM

## 2025-02-11 RX ORDER — SODIUM CHLORIDE 0.9 % (FLUSH) 0.9 %
5-40 SYRINGE (ML) INJECTION PRN
Status: DISCONTINUED | OUTPATIENT
Start: 2025-02-11 | End: 2025-02-12 | Stop reason: HOSPADM

## 2025-02-11 RX ORDER — ONDANSETRON 2 MG/ML
INJECTION INTRAMUSCULAR; INTRAVENOUS
Status: COMPLETED
Start: 2025-02-11 | End: 2025-02-11

## 2025-02-11 RX ORDER — ENOXAPARIN SODIUM 100 MG/ML
40 INJECTION SUBCUTANEOUS DAILY
Status: DISCONTINUED | OUTPATIENT
Start: 2025-02-12 | End: 2025-02-12 | Stop reason: HOSPADM

## 2025-02-11 RX ORDER — ASPIRIN 81 MG/1
243 TABLET, CHEWABLE ORAL ONCE
Status: COMPLETED | OUTPATIENT
Start: 2025-02-11 | End: 2025-02-11

## 2025-02-11 RX ORDER — 0.9 % SODIUM CHLORIDE 0.9 %
1000 INTRAVENOUS SOLUTION INTRAVENOUS ONCE
Status: DISCONTINUED | OUTPATIENT
Start: 2025-02-11 | End: 2025-02-12

## 2025-02-11 RX ORDER — SODIUM CHLORIDE 0.9 % (FLUSH) 0.9 %
5-40 SYRINGE (ML) INJECTION EVERY 12 HOURS SCHEDULED
Status: DISCONTINUED | OUTPATIENT
Start: 2025-02-11 | End: 2025-02-12 | Stop reason: HOSPADM

## 2025-02-11 RX ORDER — IOPAMIDOL 755 MG/ML
75 INJECTION, SOLUTION INTRAVASCULAR
Status: COMPLETED | OUTPATIENT
Start: 2025-02-11 | End: 2025-02-11

## 2025-02-11 RX ORDER — ACETAMINOPHEN 650 MG/1
650 SUPPOSITORY RECTAL EVERY 6 HOURS PRN
Status: DISCONTINUED | OUTPATIENT
Start: 2025-02-11 | End: 2025-02-12 | Stop reason: HOSPADM

## 2025-02-11 RX ORDER — REGADENOSON 0.08 MG/ML
0.4 INJECTION, SOLUTION INTRAVENOUS
Status: COMPLETED | OUTPATIENT
Start: 2025-02-11 | End: 2025-02-12

## 2025-02-11 RX ADMIN — IOPAMIDOL 75 ML: 755 INJECTION, SOLUTION INTRAVENOUS at 15:45

## 2025-02-11 RX ADMIN — ASPIRIN 243 MG: 81 TABLET, CHEWABLE ORAL at 14:51

## 2025-02-11 RX ADMIN — ONDANSETRON 4 MG: 2 INJECTION INTRAMUSCULAR; INTRAVENOUS at 14:57

## 2025-02-11 RX ADMIN — MORPHINE SULFATE 4 MG: 4 INJECTION, SOLUTION INTRAMUSCULAR; INTRAVENOUS at 14:58

## 2025-02-11 RX ADMIN — ONDANSETRON 4 MG: 2 INJECTION, SOLUTION INTRAMUSCULAR; INTRAVENOUS at 14:57

## 2025-02-11 RX ADMIN — LORAZEPAM 1 MG: 1 TABLET ORAL at 14:59

## 2025-02-11 ASSESSMENT — PAIN SCALES - GENERAL
PAINLEVEL_OUTOF10: 5
PAINLEVEL_OUTOF10: 5
PAINLEVEL_OUTOF10: 6
PAINLEVEL_OUTOF10: 0

## 2025-02-11 ASSESSMENT — PAIN DESCRIPTION - LOCATION
LOCATION: CHEST
LOCATION: CHEST

## 2025-02-11 ASSESSMENT — PAIN - FUNCTIONAL ASSESSMENT: PAIN_FUNCTIONAL_ASSESSMENT: 0-10

## 2025-02-11 ASSESSMENT — HEART SCORE: ECG: NON-SPECIFC REPOLARIZATION DISTURBANCE/LBTB/PM

## 2025-02-11 NOTE — ED PROVIDER NOTES
in the Last Year: No     No current facility-administered medications for this encounter.     Current Outpatient Medications   Medication Sig Dispense Refill    rosuvastatin (CRESTOR) 20 MG tablet Take 1 tablet by mouth daily 90 tablet 1     Allergies   Allergen Reactions    Ambien [Zolpidem Tartrate] Other (See Comments)     HALLUCINATIONS         PHYSICAL EXAM  ED Triage Vitals [02/11/25 1420]   BP Systolic BP Percentile Diastolic BP Percentile Temp Temp Source Pulse Respirations SpO2   129/73 -- -- 98.1 °F (36.7 °C) Oral (!) 103 18 100 %      Height Weight - Scale         1.6 m (5' 3\") 68 kg (150 lb)           General appearance: Awake and alert. Cooperative.  Uncomfortable appearing clutching chest  HEENT: Normocephalic. Atraumatic. Mucous membranes are moist.  Neck: Supple.    Heart/Chest: Mildly tachycardic. No murmurs.  Chest pain is not reproducible  Vascular: symmetric/intact radial and DP pulses bilaterally  Lungs: Respirations unlabored. CTAB. Good air exchange. Speaking comfortably in full sentences.   Abdomen: Soft. Non-tender. Non-distended. No rebound or guarding.   Musculoskeletal: No extremity edema. No deformity.   Skin: Warm and dry. No acute rashes.   Neurological: Alert and oriented. CN II-XII intact. Strength 5/5 bilateral upper and lower extremities. Sensation intact to light touch. Gait normal.    LABS  I have reviewed all labs for this visit.   Results for orders placed or performed during the hospital encounter of 02/11/25   CBC with Auto Differential   Result Value Ref Range    WBC 12.6 (H) 4.0 - 11.0 K/uL    RBC 5.08 4.00 - 5.20 M/uL    Hemoglobin 14.9 12.0 - 16.0 g/dL    Hematocrit 45.0 36.0 - 48.0 %    MCV 88.6 80.0 - 100.0 fL    MCH 29.3 26.0 - 34.0 pg    MCHC 33.1 31.0 - 36.0 g/dL    RDW 12.9 12.4 - 15.4 %    Platelets 255 135 - 450 K/uL    MPV 8.7 5.0 - 10.5 fL    Neutrophils % 83.7 %    Lymphocytes % 9.4 %    Monocytes % 5.8 %    Eosinophils % 0.8 %    Basophils % 0.3 %

## 2025-02-12 ENCOUNTER — APPOINTMENT (OUTPATIENT)
Age: 70
End: 2025-02-12
Attending: INTERNAL MEDICINE
Payer: MEDICARE

## 2025-02-12 VITALS
TEMPERATURE: 98.6 F | BODY MASS INDEX: 27.11 KG/M2 | HEART RATE: 97 BPM | WEIGHT: 153 LBS | HEIGHT: 63 IN | SYSTOLIC BLOOD PRESSURE: 92 MMHG | DIASTOLIC BLOOD PRESSURE: 52 MMHG | RESPIRATION RATE: 16 BRPM | OXYGEN SATURATION: 99 %

## 2025-02-12 LAB
ANION GAP SERPL CALCULATED.3IONS-SCNC: 12 MMOL/L (ref 3–16)
BASOPHILS # BLD: 0 K/UL (ref 0–0.2)
BASOPHILS NFR BLD: 0.3 %
BUN SERPL-MCNC: 14 MG/DL (ref 7–20)
CALCIUM SERPL-MCNC: 9 MG/DL (ref 8.3–10.6)
CHLORIDE SERPL-SCNC: 102 MMOL/L (ref 99–110)
CHOLEST SERPL-MCNC: 236 MG/DL (ref 0–199)
CO2 SERPL-SCNC: 22 MMOL/L (ref 21–32)
CREAT SERPL-MCNC: 0.9 MG/DL (ref 0.6–1.2)
DEPRECATED RDW RBC AUTO: 13 % (ref 12.4–15.4)
ECHO BSA: 1.76 M2
EKG ATRIAL RATE: 111 BPM
EKG DIAGNOSIS: NORMAL
EKG P AXIS: 72 DEGREES
EKG P-R INTERVAL: 128 MS
EKG Q-T INTERVAL: 320 MS
EKG QRS DURATION: 74 MS
EKG QTC CALCULATION (BAZETT): 435 MS
EKG R AXIS: 10 DEGREES
EKG T AXIS: 126 DEGREES
EKG VENTRICULAR RATE: 111 BPM
EOSINOPHIL # BLD: 0 K/UL (ref 0–0.6)
EOSINOPHIL NFR BLD: 0.2 %
GFR SERPLBLD CREATININE-BSD FMLA CKD-EPI: 70 ML/MIN/{1.73_M2}
GLUCOSE SERPL-MCNC: 123 MG/DL (ref 70–99)
HCT VFR BLD AUTO: 40.2 % (ref 36–48)
HDLC SERPL-MCNC: 70 MG/DL (ref 40–60)
HGB BLD-MCNC: 13.8 G/DL (ref 12–16)
LDLC SERPL CALC-MCNC: 150 MG/DL
LYMPHOCYTES # BLD: 1.6 K/UL (ref 1–5.1)
LYMPHOCYTES NFR BLD: 18.5 %
MCH RBC QN AUTO: 29.8 PG (ref 26–34)
MCHC RBC AUTO-ENTMCNC: 34.3 G/DL (ref 31–36)
MCV RBC AUTO: 87 FL (ref 80–100)
MONOCYTES # BLD: 0.9 K/UL (ref 0–1.3)
MONOCYTES NFR BLD: 10.9 %
NEUTROPHILS # BLD: 6 K/UL (ref 1.7–7.7)
NEUTROPHILS NFR BLD: 70.1 %
NUC STRESS EJECTION FRACTION: 59 %
NUC STRESS LV EDV: 64 ML (ref 56–104)
NUC STRESS LV ESV: 26 ML (ref 19–49)
NUC STRESS LV MASS: 107 G
PLATELET # BLD AUTO: 225 K/UL (ref 135–450)
PMV BLD AUTO: 8.9 FL (ref 5–10.5)
POTASSIUM SERPL-SCNC: 3.9 MMOL/L (ref 3.5–5.1)
RBC # BLD AUTO: 4.62 M/UL (ref 4–5.2)
SODIUM SERPL-SCNC: 136 MMOL/L (ref 136–145)
STRESS BASELINE DIAS BP: 69 MMHG
STRESS BASELINE HR: 106 BPM
STRESS BASELINE SYS BP: 118 MMHG
STRESS ESTIMATED WORKLOAD: 1 METS
STRESS EXERCISE DUR MIN: 4 MIN
STRESS EXERCISE DUR SEC: 0 SEC
STRESS O2 SAT PEAK: 97 %
STRESS O2 SAT REST: 97 %
STRESS PEAK DIAS BP: 72 MMHG
STRESS PEAK SYS BP: 107 MMHG
STRESS PERCENT HR ACHIEVED: 79 %
STRESS POST PEAK HR: 120 BPM
STRESS RATE PRESSURE PRODUCT: NORMAL BPM*MMHG
STRESS TARGET HR: 152 BPM
TID: 1.09
TRIGL SERPL-MCNC: 78 MG/DL (ref 0–150)
TROPONIN, HIGH SENSITIVITY: 9 NG/L (ref 0–14)
VLDLC SERPL CALC-MCNC: 16 MG/DL
WBC # BLD AUTO: 8.6 K/UL (ref 4–11)

## 2025-02-12 PROCEDURE — 80061 LIPID PANEL: CPT

## 2025-02-12 PROCEDURE — G0378 HOSPITAL OBSERVATION PER HR: HCPCS

## 2025-02-12 PROCEDURE — 80048 BASIC METABOLIC PNL TOTAL CA: CPT

## 2025-02-12 PROCEDURE — 78452 HT MUSCLE IMAGE SPECT MULT: CPT

## 2025-02-12 PROCEDURE — 36415 COLL VENOUS BLD VENIPUNCTURE: CPT

## 2025-02-12 PROCEDURE — 2500000003 HC RX 250 WO HCPCS: Performed by: INTERNAL MEDICINE

## 2025-02-12 PROCEDURE — 93010 ELECTROCARDIOGRAM REPORT: CPT | Performed by: INTERNAL MEDICINE

## 2025-02-12 PROCEDURE — 6360000002 HC RX W HCPCS: Performed by: INTERNAL MEDICINE

## 2025-02-12 PROCEDURE — 93017 CV STRESS TEST TRACING ONLY: CPT

## 2025-02-12 PROCEDURE — 96376 TX/PRO/DX INJ SAME DRUG ADON: CPT

## 2025-02-12 PROCEDURE — 3430000000 HC RX DIAGNOSTIC RADIOPHARMACEUTICAL: Performed by: INTERNAL MEDICINE

## 2025-02-12 PROCEDURE — 2580000003 HC RX 258: Performed by: EMERGENCY MEDICINE

## 2025-02-12 PROCEDURE — 85025 COMPLETE CBC W/AUTO DIFF WBC: CPT

## 2025-02-12 PROCEDURE — 6360000002 HC RX W HCPCS: Performed by: NURSE PRACTITIONER

## 2025-02-12 PROCEDURE — A9502 TC99M TETROFOSMIN: HCPCS | Performed by: INTERNAL MEDICINE

## 2025-02-12 PROCEDURE — 84484 ASSAY OF TROPONIN QUANT: CPT

## 2025-02-12 RX ORDER — ONDANSETRON 2 MG/ML
4 INJECTION INTRAMUSCULAR; INTRAVENOUS EVERY 6 HOURS PRN
Status: DISCONTINUED | OUTPATIENT
Start: 2025-02-12 | End: 2025-02-12 | Stop reason: HOSPADM

## 2025-02-12 RX ORDER — MORPHINE SULFATE 4 MG/ML
4 INJECTION, SOLUTION INTRAMUSCULAR; INTRAVENOUS EVERY 6 HOURS PRN
Status: DISCONTINUED | OUTPATIENT
Start: 2025-02-12 | End: 2025-02-12 | Stop reason: HOSPADM

## 2025-02-12 RX ADMIN — MORPHINE SULFATE 4 MG: 4 INJECTION, SOLUTION INTRAMUSCULAR; INTRAVENOUS at 00:10

## 2025-02-12 RX ADMIN — TETROFOSMIN 10 MILLICURIE: 1.38 INJECTION, POWDER, LYOPHILIZED, FOR SOLUTION INTRAVENOUS at 08:46

## 2025-02-12 RX ADMIN — TETROFOSMIN 30.1 MILLICURIE: 1.38 INJECTION, POWDER, LYOPHILIZED, FOR SOLUTION INTRAVENOUS at 10:34

## 2025-02-12 RX ADMIN — SODIUM CHLORIDE, PRESERVATIVE FREE 10 ML: 5 INJECTION INTRAVENOUS at 11:36

## 2025-02-12 RX ADMIN — SODIUM CHLORIDE, PRESERVATIVE FREE 10 ML: 5 INJECTION INTRAVENOUS at 00:11

## 2025-02-12 RX ADMIN — SODIUM CHLORIDE 500 ML/HR: 9 INJECTION, SOLUTION INTRAVENOUS at 11:29

## 2025-02-12 RX ADMIN — REGADENOSON 0.4 MG: 0.08 INJECTION, SOLUTION INTRAVENOUS at 10:28

## 2025-02-12 ASSESSMENT — PAIN SCALES - GENERAL
PAINLEVEL_OUTOF10: 0
PAINLEVEL_OUTOF10: 5
PAINLEVEL_OUTOF10: 3
PAINLEVEL_OUTOF10: 3

## 2025-02-12 ASSESSMENT — PAIN DESCRIPTION - LOCATION
LOCATION: CHEST
LOCATION: CHEST

## 2025-02-12 NOTE — ED NOTES
Kortney Arriaga is a 70 y.o. female admitted for  Principal Problem:    Chest pain  Resolved Problems:    * No resolved hospital problems. *  .   Patient Home via EMS transportation with   Chief Complaint   Patient presents with    Chest Pain     Patient states midsternal cp and pressure that started around 12:15 today, took 81 mg aspirin, denies cardiac hx.    .  Patient is alert and Person, Place, and Time  Patient's baseline mobility: Baseline Mobility: Independent   Code Status: Prior   Cardiac Rhythm: Cardiac Rhythm: Sinus rhythm     Is patient on baseline Oxygen: no how many Liters:   Abnormal Assessment Findings: Pt presents with the complaint of midsternal chest pain while watching TV. Pt also states she has increased SOB with ambulation. Pt denies any cardiac hx. Pt recently had foot surgery and there is a concern of possible infection.    Isolation: None      NIH Score:    C-SSRS: Risk of Suicide: No Risk  Bedside swallow:        Active LDA's:   Peripheral IV 02/11/25 Right Antecubital (Active)   Site Assessment Clean, dry & intact 02/11/25 1448     Patient admitted with a porras: no If the porras is chronic was it exchanged:NA  Reason for porras:   Patient admitted with Central Line:  NA . PICC line placement confirmed: YES OR NO:[id number]}   Reason for Central line:   Was central line Inserted from an outside facility:        Family/Caregiver Present no Any Concerns: no   Restraints no  Sitter no         Vitals: MEWS Score: 2    Vitals:    02/11/25 1800 02/11/25 1815 02/11/25 1830 02/11/25 1845   BP: 110/61 (!) 105/59 (!) 104/57 113/63   Pulse: (!) 106 (!) 107 (!) 104 (!) 110   Resp: 21 29 12 17   Temp:       TempSrc:       SpO2: 100% 98% 98% 100%   Weight:       Height:           Last documented pain score (0-10 scale) Pain Level: 0  Pain medication administered Yes- see MAR.    Pertinent or High Risk Medications/Drips: No.    Pending Blood Product Administration: no    Abnormal labs:   Abnormal Labs

## 2025-02-12 NOTE — CARE COORDINATION
Discharge Planning Assessment:   Patient from  home, currently in Observation with an anticipated short hospitalization length of stay. Chart reviewed and it appears that patient has - None needs at this time.     PCP; Keeley Velasco MD    Insurance ;  Medicare    Discussed with patient’s nurse and requested that case management be notified when /if additional discharge needs are identified.     *Case management will continue to follow progress and update discharge plan as needed.

## 2025-02-12 NOTE — H&P
HOSPITALISTS HISTORY AND PHYSICAL    2/11/2025 7:11 PM    Patient Information:  YOLA ANGEL is a 70 y.o. female [id number]  PCP:  Keeley Velasco MD (Tel: [phone] )    Chief complaint:    Chief Complaint   Patient presents with    Chest Pain     Patient states midsternal cp and pressure that started around 12:15 today, took 81 mg aspirin, denies cardiac hx.        History of Present Illness:  Yola Angel is a 70 y.o. female who p presented to the hospital with sudden onset midsternal pressure-like chest pain that started in the epigastric region rating toward her neck associated with shortness of breath was 10 out of 10 pressure-like pain occurred while she was sitting pain has not resolved patient never had this happen to her in the past does [place] any exertional dyspnea does not smoke or drink no prior cardiac history.  In the ED patient was also found to be tachycardic CT PE was negative does have a white count of 12.4 thousand baseline white count is 4000 did have hammertoe surgery on 27 January.  Not on any steroids at home denies any fever chills or sweats no nausea vomiting or diarrhea no cough      REVIEW OF SYSTEMS:   Constitutional: Negative for fever,chills or night sweats  ENT: Negative for rhinorrhea, epistaxis, hoarseness, sore throat.  Gastrointestinal: Negative for nausea, vomiting, diarrhea  Genitourinary: Negative for polyuria, dysuria   Hematologic/Lymphatic: Negative for bleeding tendency, easy bruising  Musculoskeletal: Negative for myalgias and arthralgias  Neurologic: Negative for confusion,dysarthria.  Skin: Negative for itching,rash  Psychiatric: Negative for depression,anxiety, agitation.  Endocrine: Negative for polydipsia,polyuria,heat /cold intolerance.    Past Medical History:   has a past medical history of Hearing loss, Hip pain, Hyperlipidemia, Insomnia, unspecified,

## 2025-02-12 NOTE — ACP (ADVANCE CARE PLANNING)
Advanced Care Planning Note.    Purpose of Encounter: Advanced care planning in light of hospitalization  Parties In Attendance: Patient,    Decisional Capacity: Yes  Subjective: Patient  understand that this conversation is to address long term care goal  Objective: Patient admitted to the hospital with chest pain  Goals of Care Determination: Patient would pursue CPR and Intubation if required.   Code Status: full code  Time spent on Advanced care Plannin minutes  Advanced Care Planning Documents: documented patient's wishes, would like Mike Sagastume to make medical decisions if unable to make decisions    Pawan Gongora MD  2025 7:16 PM

## 2025-02-12 NOTE — DISCHARGE SUMMARY
swelling.     Postoperative changes of the right foot as above. No obvious complication.         Signed:    Pawan Gongora MD   2/12/2025

## 2025-02-12 NOTE — PLAN OF CARE
Problem: Discharge Planning  Goal: Discharge to home or other facility with appropriate resources  Outcome: Completed     Problem: Pain  Goal: Verbalizes/displays adequate comfort level or baseline comfort level  Outcome: Completed     Problem: Neurosensory - Adult  Goal: Achieves stable or improved neurological status  Outcome: Completed     Problem: Respiratory - Adult  Goal: Achieves optimal ventilation and oxygenation  Outcome: Completed     Problem: Cardiovascular - Adult  Goal: Maintains optimal cardiac output and hemodynamic stability  Outcome: Completed  Goal: Absence of cardiac dysrhythmias or at baseline  Outcome: Completed     Problem: Skin/Tissue Integrity - Adult  Goal: Skin integrity remains intact  Outcome: Completed  Goal: Incisions, wounds, or drain sites healing without S/S of infection  Outcome: Completed  Goal: Oral mucous membranes remain intact  Outcome: Completed     Problem: Musculoskeletal - Adult  Goal: Return mobility to safest level of function  Outcome: Completed  Goal: Maintain proper alignment of affected body part  Outcome: Completed  Goal: Return ADL status to a safe level of function  Outcome: Completed     Problem: Gastrointestinal - Adult  Goal: Minimal or absence of nausea and vomiting  Outcome: Completed  Goal: Maintains or returns to baseline bowel function  Outcome: Completed  Goal: Maintains adequate nutritional intake  Outcome: Completed     Problem: Genitourinary - Adult  Goal: Absence of urinary retention  Outcome: Completed     Problem: Infection - Adult  Goal: Absence of infection at discharge  Outcome: Completed  Goal: Absence of infection during hospitalization  Outcome: Completed     Problem: Metabolic/Fluid and Electrolytes - Adult  Goal: Electrolytes maintained within normal limits  Outcome: Completed  Goal: Hemodynamic stability and optimal renal function maintained  Outcome: Completed     Problem: Hematologic - Adult  Goal: Maintains hematologic

## 2025-02-12 NOTE — PROGRESS NOTES
Data- discharge order received, pt verbalized agreement to discharge, disposition to previous residence, no needs for HHC/DME.     Action- discharge instructions prepared and given to Kortney, pt verbalized understanding. Medication information packet given r/t NEW and/or CHANGED prescriptions emphasizing name/purpose/side effects, pt verbalized understanding. Discharge instruction summary: Diet- low sodium-pt given hand out for GERD diet, Activity- As tolerated with post op walking shoe to right foot as ordered prior to admission, Primary Care Physician as follows: Keeley Velasco -[phone] f/u appointment to be made in one week, pt to schedule appointment with GI as needed, Inpatient surgical procedure precautions reviewed:      1. WEIGHT: Admit Weight - Scale: 68 kg (150 lb) (02/11/25 1420)        Today  Weight - Scale: 69.4 kg (153 lb) (02/12/25 1018)       2. O2 SAT.: SpO2: 99 % (02/12/25 1130)    Response- Pt belongings gathered, IV removed. Disposition is home (no HHC/DME needs), transported to [facility] via w/c w/ belongings, no complications.

## 2025-02-13 ENCOUNTER — TELEPHONE (OUTPATIENT)
Dept: INTERNAL MEDICINE CLINIC | Age: 70
End: 2025-02-13

## 2025-02-13 NOTE — TELEPHONE ENCOUNTER
Care Transitions Initial Follow Up Call    Outreach made within 2 business days of discharge: Yes    Patient: Kortney Arriaga Patient : 1955   MRN: [id number]  Reason for Admission: 2025  Discharge Date: 25       Spoke with: Kortney    Discharge department/facility: [facility] Interactive Patient Contact:  Was patient able to fill all prescriptions: No: No prescriptions given  Was patient instructed to bring all medications to the follow-up visit: Yes  Is patient taking all medications as directed in the discharge summary? No new prescriptions given  Does patient understand their discharge instructions: Yes  Does patient have questions or concerns that need addressed prior to 7-14 day follow up office visit: no    Additional needs identified to be addressed with provider  No needs identified             Scheduled appointment with PCP within 7-14 days    Follow Up  Future Appointments   Date Time Provider Department Center   2025  2:40 PM Keeley Velasco MD [facility] DEP       Ayesha Rojas LPN

## 2025-02-15 LAB
BACTERIA BLD CULT ORG #2: NORMAL
BACTERIA BLD CULT: NORMAL

## 2025-02-17 ENCOUNTER — OFFICE VISIT (OUTPATIENT)
Dept: INTERNAL MEDICINE CLINIC | Age: 70
End: 2025-02-17

## 2025-02-17 VITALS
HEART RATE: 92 BPM | SYSTOLIC BLOOD PRESSURE: 132 MMHG | DIASTOLIC BLOOD PRESSURE: 80 MMHG | OXYGEN SATURATION: 99 % | HEIGHT: 63 IN | BODY MASS INDEX: 28.1 KG/M2 | WEIGHT: 158.6 LBS

## 2025-02-17 DIAGNOSIS — R07.9 CHEST PAIN, UNSPECIFIED TYPE: ICD-10-CM

## 2025-02-17 DIAGNOSIS — R79.82 ELEVATED C-REACTIVE PROTEIN (CRP): ICD-10-CM

## 2025-02-17 DIAGNOSIS — R06.02 SHORTNESS OF BREATH: ICD-10-CM

## 2025-02-17 DIAGNOSIS — Z09 HOSPITAL DISCHARGE FOLLOW-UP: Primary | ICD-10-CM

## 2025-02-17 ASSESSMENT — PATIENT HEALTH QUESTIONNAIRE - PHQ9
SUM OF ALL RESPONSES TO PHQ9 QUESTIONS 1 & 2: 0
2. FEELING DOWN, DEPRESSED OR HOPELESS: NOT AT ALL
1. LITTLE INTEREST OR PLEASURE IN DOING THINGS: NOT AT ALL
SUM OF ALL RESPONSES TO PHQ QUESTIONS 1-9: 0

## 2025-02-17 NOTE — PROGRESS NOTES
previously viewed information sent to me on the day of admission and discharge also.       Subjective:   HPI  Went to ER for chest pain.  Had mid sternal chest pain and pressure radiating to left shoulder blade around Noon on day of admission.  Assoc with SOB and diaphoresis.  Had not eaten lunch.    Had a bran muffin and orange juice for breakfast 3-4 hours before the chest pain.  Was sitting watching TV.    Tried to eat and could not eat and even drinking water did not help.  Went to ER about an hour later.      Inpatient course: Discharge summary reviewed- see chart.  CTPA negative for PE.  Troponins and stress test negative.  Felt to be less likely cardiac CP.   Also at first thought possible sepsis but repeat testing rules this out     Interval history/Current status:   No further symptoms since home from hospital.  Sometimes has night time panic attacks.  Had one on 2/14 and one last night.   Has panic attacks for 20 years.  Normally does not have chest pain.  Feels her heart racing.      Patient Active Problem List   Diagnosis    Elevated BP without diagnosis of hypertension    Chest pain       Medications listed as ordered at the time of discharge from hospital     Medication List            Accurate as of February 17, 2025  2:50 PM. If you have any questions, ask your nurse or doctor.                CONTINUE taking these medications      rosuvastatin 20 MG tablet  Commonly known as: CRESTOR  Take 1 tablet by mouth daily               Medications marked \"taking\" at this time  Outpatient Medications Marked as Taking for the 2/17/25 encounter (Office Visit) with Keeley Velasco MD   Medication Sig Dispense Refill    rosuvastatin (CRESTOR) 20 MG tablet Take 1 tablet by mouth daily 90 tablet 1        Medications patient taking as of now reconciled against medications ordered at time of hospital discharge: Yes    Review of Systems    Objective:    /80 (Site: Left Upper Arm, Position: Sitting, Cuff Size:

## 2025-02-17 NOTE — PATIENT INSTRUCTIONS
Try to cut back on portion sizes and look over diet while you are less active.     If the heart echo is normal and you have similar chest pain come back, take a dose of liquid antiacid.  If that helps it is more likely the esophagus as the cause.

## 2025-03-12 ENCOUNTER — RESULTS FOLLOW-UP (OUTPATIENT)
Age: 70
End: 2025-03-12

## 2025-03-12 ENCOUNTER — HOSPITAL ENCOUNTER (OUTPATIENT)
Age: 70
Discharge: HOME | End: 2025-03-14
Attending: FAMILY MEDICINE
Payer: MEDICARE

## 2025-03-12 VITALS
DIASTOLIC BLOOD PRESSURE: 85 MMHG | SYSTOLIC BLOOD PRESSURE: 127 MMHG | BODY MASS INDEX: 28 KG/M2 | HEIGHT: 63 IN | WEIGHT: 158 LBS

## 2025-03-12 DIAGNOSIS — R07.9 CHEST PAIN, UNSPECIFIED TYPE: ICD-10-CM

## 2025-03-12 DIAGNOSIS — R06.02 SHORTNESS OF BREATH: ICD-10-CM

## 2025-03-12 DIAGNOSIS — R93.1 ABNORMAL ECHOCARDIOGRAM: Primary | ICD-10-CM

## 2025-03-12 LAB
ECHO AO ASC DIAM: 3.1 CM
ECHO AO ASCENDING AORTA INDEX: 1.77 CM/M2
ECHO AO ROOT DIAM: 2.8 CM
ECHO AO ROOT INDEX: 1.6 CM/M2
ECHO AV AREA PEAK VELOCITY: 2.9 CM2
ECHO AV AREA VTI: 3.1 CM2
ECHO AV AREA/BSA PEAK VELOCITY: 1.7 CM2/M2
ECHO AV AREA/BSA VTI: 1.8 CM2/M2
ECHO AV MEAN GRADIENT: 3 MMHG
ECHO AV MEAN VELOCITY: 0.8 M/S
ECHO AV PEAK GRADIENT: 4 MMHG
ECHO AV PEAK VELOCITY: 1.1 M/S
ECHO AV VELOCITY RATIO: 0.82
ECHO AV VTI: 20.5 CM
ECHO BSA: 1.78 M2
ECHO LA AREA 2C: 14.4 CM2
ECHO LA AREA 4C: 10.9 CM2
ECHO LA DIAMETER INDEX: 1.71 CM/M2
ECHO LA DIAMETER: 3 CM
ECHO LA MAJOR AXIS: 5.1 CM
ECHO LA MINOR AXIS: 4.6 CM
ECHO LA TO AORTIC ROOT RATIO: 1.07
ECHO LA VOL BP: 27 ML (ref 22–52)
ECHO LA VOL MOD A2C: 37 ML (ref 22–52)
ECHO LA VOL MOD A4C: 18 ML (ref 22–52)
ECHO LA VOL/BSA BIPLANE: 15 ML/M2 (ref 16–34)
ECHO LA VOLUME INDEX MOD A2C: 21 ML/M2 (ref 16–34)
ECHO LA VOLUME INDEX MOD A4C: 10 ML/M2 (ref 16–34)
ECHO LV E' LATERAL VELOCITY: 9.2 CM/S
ECHO LV E' SEPTAL VELOCITY: 6.2 CM/S
ECHO LV EDV A2C: 83 ML
ECHO LV EDV A4C: 84 ML
ECHO LV EDV INDEX A4C: 48 ML/M2
ECHO LV EDV NDEX A2C: 47 ML/M2
ECHO LV EF PHYSICIAN: 51 %
ECHO LV EJECTION FRACTION A2C: 53 %
ECHO LV EJECTION FRACTION A4C: 47 %
ECHO LV EJECTION FRACTION BIPLANE: 51 % (ref 55–100)
ECHO LV ESV A2C: 39 ML
ECHO LV ESV A4C: 44 ML
ECHO LV ESV INDEX A2C: 22 ML/M2
ECHO LV ESV INDEX A4C: 25 ML/M2
ECHO LV FRACTIONAL SHORTENING: 22 % (ref 28–44)
ECHO LV INTERNAL DIMENSION DIASTOLE INDEX: 2.57 CM/M2
ECHO LV INTERNAL DIMENSION DIASTOLIC: 4.5 CM (ref 3.9–5.3)
ECHO LV INTERNAL DIMENSION SYSTOLIC INDEX: 2 CM/M2
ECHO LV INTERNAL DIMENSION SYSTOLIC: 3.5 CM
ECHO LV IVSD: 0.8 CM (ref 0.6–0.9)
ECHO LV MASS 2D: 123.1 G (ref 67–162)
ECHO LV MASS INDEX 2D: 70.3 G/M2 (ref 43–95)
ECHO LV POSTERIOR WALL DIASTOLIC: 0.9 CM (ref 0.6–0.9)
ECHO LV RELATIVE WALL THICKNESS RATIO: 0.4
ECHO LVOT AREA: 3.5 CM2
ECHO LVOT AV VTI INDEX: 0.88
ECHO LVOT DIAM: 2.1 CM
ECHO LVOT MEAN GRADIENT: 2 MMHG
ECHO LVOT PEAK GRADIENT: 3 MMHG
ECHO LVOT PEAK VELOCITY: 0.9 M/S
ECHO LVOT STROKE VOLUME INDEX: 35.8 ML/M2
ECHO LVOT SV: 62.7 ML
ECHO LVOT VTI: 18.1 CM
ECHO MV A VELOCITY: 0.96 M/S
ECHO MV E VELOCITY: 0.56 M/S
ECHO MV E/A RATIO: 0.58
ECHO MV E/E' LATERAL: 6.09
ECHO MV E/E' RATIO (AVERAGED): 7.56
ECHO MV E/E' SEPTAL: 9.03
ECHO PULMONARY ARTERY END DIASTOLIC PRESSURE: 3 MMHG
ECHO PV MAX VELOCITY: 0.7 M/S
ECHO PV PEAK GRADIENT: 2 MMHG
ECHO PV REGURGITANT MAX VELOCITY: 0.9 M/S
ECHO RA AREA 4C: 11.6 CM2
ECHO RA END SYSTOLIC VOLUME APICAL 4 CHAMBER INDEX BSA: 13 ML/M2
ECHO RA VOLUME: 22 ML
ECHO RV BASAL DIMENSION: 2.7 CM
ECHO RV FREE WALL PEAK S': 14.4 CM/S
ECHO RV LONGITUDINAL DIMENSION: 6.6 CM
ECHO RV MID DIMENSION: 2.1 CM
ECHO RV TAPSE: 2.2 CM (ref 1.7–?)

## 2025-03-12 PROCEDURE — 93306 TTE W/DOPPLER COMPLETE: CPT

## 2025-03-18 ENCOUNTER — TELEPHONE (OUTPATIENT)
Dept: CARDIOLOGY CLINIC | Age: 70
End: 2025-03-18

## 2025-03-18 NOTE — TELEPHONE ENCOUNTER
Patient was referred by Keeley Velasco to the [facility] FF location with Dr. Barahona.  Patient has been scheduled for 3/25/25 at 3:15pm.  Patient verbalized understanding of appointment instructions.  Call complete.

## 2025-03-19 NOTE — PROGRESS NOTES
[facility]   Cardiac Consultation    Referring Provider:  Keeley Velasco MD     No chief complaint on file.       History of Present Illness:   Ms Arriaga is a 70 y.o. female being seen as new patient today for chest pain with abnormal echo; referred by her PCP, Dr Velasco. She had a recent foot surgery on 1/27/25 and has a PMH of hyperlipidemia and is taking Crestor 20 mg daily.    She was admitted to [facility] on 2/11/25 for chest pain with associated shortness of breath. CT PE was negative. Troponins and stress test were negative.  She was discharged home. She had a follow up appt with her PCP and an echo was ordered. Echo was completed and EF was visually estimated at 50-55%. EF by Hackett's Biplane 51%. Agitated saline study was positive without provocation; right to left shunt noted.    Today, she     Past Medical History:   has a past medical history of Hearing loss, Hip pain, Hyperlipidemia, Insomnia, unspecified, Pelvic relaxation due to uterovaginal prolapse, incomplete, PMS (premenstrual syndrome), and Seasonal allergies.    Surgical History:   has a past surgical history that includes Tubal ligation (01/01/1988); Tonsillectomy (01/01/1967); meniscectomy (04/01/2006); Bunionectomy (01/01/2002); Colonoscopy (age 52); Ovarian cyst surgery; Hysterectomy, vaginal (11/01/2011); Mohs surgery (Left, 12/01/2022); and Hammer toe surgery (Right, 1/27/2025).     Social History:   reports that she has never smoked. She has never used smokeless tobacco. She reports that she does not currently use alcohol after a past usage of about 3.0 standard drinks of alcohol per week. She reports that she does not use drugs.     Family History:  family history includes Alcohol Abuse in her father and mother; Other in an other family member; Rheum Arthritis in an other family member.     Home Medications:  Prior to Admission medications    Medication Sig Start Date End Date Taking? Authorizing

## 2025-03-19 NOTE — PROGRESS NOTES
[facility]   Cardiac Consultation    Referring Provider:  Keeley Velasco MD     Chief Complaint   Patient presents with     Abnormal echo    Chest Pain    New Patient    Hyperlipidemia        History of Present Illness:  Ms Arriaga is a 70 y.o. female being seen as new patient today for chest pain with abnormal echo; referred by her PCP, Dr Velasco. She had a recent foot surgery on 1/27/25 and has a PMH of hyperlipidemia and is taking Crestor 20 mg daily.    She was admitted to [facility] on 2/11/25 for chest pain with associated shortness of breath. CT PE was negative. Troponins and stress test were negative.  She was discharged home. She had a follow up appt with her PCP and an echo was ordered. Echo was completed and EF was visually estimated at 50-55%. EF by Hackett's Biplane 51%. Agitated saline study was positive without provocation; right to left shunt noted.    Today, she is here as a new patient due to abnormal echocardiogram. Pt denies exertional chest pain, SCOTT/PND, palpitations, light-headedness, edema. She feels good and has recovered since hospitalization. She likes to hike and walk and is back up to 3 miles per day since surgery for hammer toe.  She is not taking a statin currently but plans to reintroduce Crestor in the next couple of weeks.        Past Medical History:   has a past medical history of Hearing loss, Hip pain, Hyperlipidemia, Insomnia, unspecified, Pelvic relaxation due to uterovaginal prolapse, incomplete, PMS (premenstrual syndrome), and Seasonal allergies.    Surgical History:   has a past surgical history that includes Tubal ligation (01/01/1988); Tonsillectomy (01/01/1967); meniscectomy (04/01/2006); Bunionectomy (01/01/2002); Colonoscopy (age 52); Ovarian cyst surgery; Hysterectomy, vaginal (11/01/2011); Mohs surgery (Left, 12/01/2022); and Hammer toe surgery (Right, 1/27/2025).     Social History:   reports that she has never smoked. She has been

## 2025-03-25 ENCOUNTER — OFFICE VISIT (OUTPATIENT)
Dept: CARDIOLOGY CLINIC | Age: 70
End: 2025-03-25
Payer: MEDICARE

## 2025-03-25 VITALS
HEART RATE: 109 BPM | DIASTOLIC BLOOD PRESSURE: 86 MMHG | WEIGHT: 155.6 LBS | HEIGHT: 63 IN | OXYGEN SATURATION: 99 % | SYSTOLIC BLOOD PRESSURE: 114 MMHG | BODY MASS INDEX: 27.57 KG/M2

## 2025-03-25 DIAGNOSIS — R93.1 ABNORMAL ECHOCARDIOGRAM: Primary | ICD-10-CM

## 2025-03-25 DIAGNOSIS — E78.5 HYPERLIPIDEMIA, UNSPECIFIED HYPERLIPIDEMIA TYPE: ICD-10-CM

## 2025-03-25 DIAGNOSIS — R07.9 CHEST PAIN, UNSPECIFIED TYPE: ICD-10-CM

## 2025-03-25 DIAGNOSIS — Q21.12 PFO (PATENT FORAMEN OVALE): ICD-10-CM

## 2025-03-25 PROCEDURE — G8427 DOCREV CUR MEDS BY ELIG CLIN: HCPCS | Performed by: INTERNAL MEDICINE

## 2025-03-25 PROCEDURE — 1159F MED LIST DOCD IN RCRD: CPT | Performed by: INTERNAL MEDICINE

## 2025-03-25 PROCEDURE — 1090F PRES/ABSN URINE INCON ASSESS: CPT | Performed by: INTERNAL MEDICINE

## 2025-03-25 PROCEDURE — 1036F TOBACCO NON-USER: CPT | Performed by: INTERNAL MEDICINE

## 2025-03-25 PROCEDURE — 99214 OFFICE O/P EST MOD 30 MIN: CPT | Performed by: INTERNAL MEDICINE

## 2025-03-25 PROCEDURE — G8419 CALC BMI OUT NRM PARAM NOF/U: HCPCS | Performed by: INTERNAL MEDICINE

## 2025-03-25 PROCEDURE — 93000 ELECTROCARDIOGRAM COMPLETE: CPT | Performed by: INTERNAL MEDICINE

## 2025-03-25 PROCEDURE — 1123F ACP DISCUSS/DSCN MKR DOCD: CPT | Performed by: INTERNAL MEDICINE

## 2025-03-25 PROCEDURE — G8399 PT W/DXA RESULTS DOCUMENT: HCPCS | Performed by: INTERNAL MEDICINE

## 2025-03-25 PROCEDURE — 3017F COLORECTAL CA SCREEN DOC REV: CPT | Performed by: INTERNAL MEDICINE

## 2025-03-25 NOTE — PATIENT INSTRUCTIONS
Patent foramen ovale (PFO)  No medication changes  Continue risk factor modifications.   Call for any change in symptoms, call to report any changes in shortness of breath or development of chest pain with activity.    Follow up as needed, FU with Dr Velasco

## (undated) DEVICE — GAUZE,SPONGE,4"X4",8PLY,STRL,LF,10/TRAY: Brand: MEDLINE

## (undated) DEVICE — DRESSING PETRO W3XL3IN OIL EMUL N ADH GZ KNIT IMPREG CELOS

## (undated) DEVICE — GLOVE ORTHO 8   MSG9480

## (undated) DEVICE — BANDAGE,GAUZE,BULKEE II,4.5"X4.1YD,STRL: Brand: MEDLINE

## (undated) DEVICE — SUTURE VICRYL + SZ 3-0 L18IN ABSRB UD PS-2 3/8 CIR REV CUT VCP497H

## (undated) DEVICE — LOWER EXTREMITY: Brand: MEDLINE INDUSTRIES, INC.

## (undated) DEVICE — SUTURE NONABSORBABLE MONOFILAMENT 3-0 PS-1 18 IN BLK ETHILON 1663H

## (undated) DEVICE — INTENDED FOR TISSUE SEPARATION, AND OTHER PROCEDURES THAT REQUIRE A SHARP SURGICAL BLADE TO PUNCTURE OR CUT.: Brand: BARD-PARKER ® STAINLESS STEEL BLADES

## (undated) DEVICE — APPLICATOR MEDICATED 26 CC SOLUTION HI LT ORNG CHLORAPREP

## (undated) DEVICE — PRECISION THIN (5.5 X 0.38 X 18.0MM)

## (undated) DEVICE — GOWN,AURORA,NONREINF,RAGLAN,XXL,STERILE: Brand: MEDLINE

## (undated) DEVICE — ELECTRODE PT RET AD L9FT HI MOIST COND ADH HYDRGEL CORDED

## (undated) DEVICE — BANDAGE COMPR W4INXL10YD WHITE/BEIGE E MTRX HK LOOP CLSR